# Patient Record
Sex: FEMALE | Race: WHITE | Employment: FULL TIME
[De-identification: names, ages, dates, MRNs, and addresses within clinical notes are randomized per-mention and may not be internally consistent; named-entity substitution may affect disease eponyms.]

---

## 2017-06-17 ENCOUNTER — HEALTH MAINTENANCE LETTER (OUTPATIENT)
Age: 47
End: 2017-06-17

## 2017-10-31 ENCOUNTER — OFFICE VISIT (OUTPATIENT)
Dept: PEDIATRICS | Facility: CLINIC | Age: 47
End: 2017-10-31
Payer: COMMERCIAL

## 2017-10-31 ENCOUNTER — TELEPHONE (OUTPATIENT)
Dept: PEDIATRICS | Facility: CLINIC | Age: 47
End: 2017-10-31

## 2017-10-31 VITALS
DIASTOLIC BLOOD PRESSURE: 86 MMHG | BODY MASS INDEX: 35.58 KG/M2 | TEMPERATURE: 97.4 F | HEIGHT: 64 IN | HEART RATE: 68 BPM | SYSTOLIC BLOOD PRESSURE: 140 MMHG | WEIGHT: 208.4 LBS

## 2017-10-31 DIAGNOSIS — R03.0 ELEVATED BLOOD PRESSURE READING WITHOUT DIAGNOSIS OF HYPERTENSION: ICD-10-CM

## 2017-10-31 DIAGNOSIS — R42 DIZZINESS: Primary | ICD-10-CM

## 2017-10-31 LAB
ALBUMIN SERPL-MCNC: 3.9 G/DL (ref 3.4–5)
ALP SERPL-CCNC: 59 U/L (ref 40–150)
ALT SERPL W P-5'-P-CCNC: 52 U/L (ref 0–50)
ANION GAP SERPL CALCULATED.3IONS-SCNC: 9 MMOL/L (ref 3–14)
AST SERPL W P-5'-P-CCNC: 25 U/L (ref 0–45)
BASOPHILS # BLD AUTO: 0 10E9/L (ref 0–0.2)
BASOPHILS NFR BLD AUTO: 0.4 %
BILIRUB SERPL-MCNC: 0.4 MG/DL (ref 0.2–1.3)
BUN SERPL-MCNC: 11 MG/DL (ref 7–30)
CALCIUM SERPL-MCNC: 9.3 MG/DL (ref 8.5–10.1)
CHLORIDE SERPL-SCNC: 106 MMOL/L (ref 94–109)
CO2 SERPL-SCNC: 26 MMOL/L (ref 20–32)
CREAT SERPL-MCNC: 0.7 MG/DL (ref 0.52–1.04)
DIFFERENTIAL METHOD BLD: NORMAL
EOSINOPHIL # BLD AUTO: 0.2 10E9/L (ref 0–0.7)
EOSINOPHIL NFR BLD AUTO: 2.7 %
ERYTHROCYTE [DISTWIDTH] IN BLOOD BY AUTOMATED COUNT: 12.2 % (ref 10–15)
GFR SERPL CREATININE-BSD FRML MDRD: >90 ML/MIN/1.7M2
GLUCOSE SERPL-MCNC: 93 MG/DL (ref 70–99)
HCT VFR BLD AUTO: 44.6 % (ref 35–47)
HGB BLD-MCNC: 14.9 G/DL (ref 11.7–15.7)
LYMPHOCYTES # BLD AUTO: 2.4 10E9/L (ref 0.8–5.3)
LYMPHOCYTES NFR BLD AUTO: 33.9 %
MCH RBC QN AUTO: 31.6 PG (ref 26.5–33)
MCHC RBC AUTO-ENTMCNC: 33.4 G/DL (ref 31.5–36.5)
MCV RBC AUTO: 95 FL (ref 78–100)
MONOCYTES # BLD AUTO: 0.4 10E9/L (ref 0–1.3)
MONOCYTES NFR BLD AUTO: 6.1 %
NEUTROPHILS # BLD AUTO: 4 10E9/L (ref 1.6–8.3)
NEUTROPHILS NFR BLD AUTO: 56.9 %
PLATELET # BLD AUTO: 295 10E9/L (ref 150–450)
POTASSIUM SERPL-SCNC: 4.3 MMOL/L (ref 3.4–5.3)
PROT SERPL-MCNC: 7.3 G/DL (ref 6.8–8.8)
RBC # BLD AUTO: 4.71 10E12/L (ref 3.8–5.2)
SODIUM SERPL-SCNC: 141 MMOL/L (ref 133–144)
TSH SERPL DL<=0.005 MIU/L-ACNC: 3.42 MU/L (ref 0.4–4)
WBC # BLD AUTO: 7.1 10E9/L (ref 4–11)

## 2017-10-31 PROCEDURE — 36415 COLL VENOUS BLD VENIPUNCTURE: CPT | Performed by: INTERNAL MEDICINE

## 2017-10-31 PROCEDURE — 99213 OFFICE O/P EST LOW 20 MIN: CPT | Performed by: INTERNAL MEDICINE

## 2017-10-31 PROCEDURE — 80050 GENERAL HEALTH PANEL: CPT | Performed by: INTERNAL MEDICINE

## 2017-10-31 NOTE — PROGRESS NOTES
"  SUBJECTIVE:   Roxie Funez is a 47 year old female who presents to clinic today for the following health issues:      Note copied from triage:  Note      Patient feeling odd this am. States she has felt this way for 2 days.  States she is a little dizzy and nauseated. No cold symptoms. No fever or sweats. Took her bp when she got up and it byd990/91 repeated it after eating breakfast and it was 148/88. Requesting to come in and be seen and an appointment was made.            Felt \"off\" yesterday; not dizzy, but a little dizzy and nauseous; in a brain fog. Today felt the same on waking up; blood pressure at work today was up to 152/91.       No infection or chills.  No stomach flu but does feel shortness of breath and tired.  No diarrhea or vomiting.  No sore throat, earache, or headache.   No real focal symptoms.     Sick contacts at work.     Problem list and histories reviewed & adjusted, as indicated.  Additional history: as documented    Patient Active Problem List   Diagnosis     Major depressive disorder, single episode, mild (H)     Patellofemoral pain syndrome     Low back pain     Obesity     Raynaud's disease     Past Surgical History:   Procedure Laterality Date     BREAST LUMPECTOMY, RT/LT  2005    left     C LIGATE FALLOPIAN TUBE,POSTPARTUM  1997    Tubal Ligation     C NONSPECIFIC PROCEDURE  1999    Diagnostic Laparoscopy     C TOTAL ABDOM HYSTERECTOMY  5/2000    Hysterectomy, Total Abdominal     HC DILATION/CURETTAGE DIAG/THER NON OB  2000    D & C     HC REMOVAL OF OVARY/TUBE(S)  5/2000    Salpingo-Oophorectomy, Unilateral       HC REMOVAL OF TONSILS,12+ Y/O         Social History   Substance Use Topics     Smoking status: Never Smoker     Smokeless tobacco: Never Used     Alcohol use 12.0 oz/week      Comment: is cautious about intake due to family history     Family History   Problem Relation Age of Onset     CANCER Mother      ovarian at age 30, no other family hx of ovarian or breast ca " "    DIABETES Father      DIABETES Paternal Grandfather      Alcohol/Drug Brother      both parents also have Etoh issues     Breast Cancer No family hx of      Cancer - colorectal No family hx of      C.A.D. No family hx of      Eye Disorder No family hx of      no glaucoma         No current outpatient prescriptions on file.     Allergies   Allergen Reactions     Sulfa Drugs      BP Readings from Last 3 Encounters:   10/31/17 140/86   09/10/15 120/82   08/28/14 126/78    Wt Readings from Last 3 Encounters:   10/31/17 208 lb 6.4 oz (94.5 kg)   09/10/15 197 lb (89.4 kg)   08/28/14 200 lb (90.7 kg)                  Labs reviewed in EPIC        Reviewed and updated as needed this visit by clinical staff       Reviewed and updated as needed this visit by Provider         ROS:  C: NEGATIVE for fever, chills, change in weight  E/M: NEGATIVE for ear, mouth and throat problems  R: NEGATIVE for significant cough or SOB  CV: NEGATIVE for chest pain, palpitations or peripheral edema    OBJECTIVE:                                                    /86 (BP Location: Right arm, Patient Position: Chair, Cuff Size: Adult Large)  Pulse 68  Temp 97.4  F (36.3  C) (Tympanic)  Ht 5' 3.5\" (1.613 m)  Wt 208 lb 6.4 oz (94.5 kg)  BMI 36.34 kg/m2  Body mass index is 36.34 kg/(m^2).   GENERAL: healthy, alert, well nourished, well hydrated, no distress  HENT: ear canals- normal; TMs- normal; Nose- normal; Mouth- no ulcers, no lesions  NECK: no tenderness, no adenopathy, no asymmetry, no masses, no stiffness; thyroid- normal to palpation  RESP: lungs clear to auscultation - no rales, no rhonchi, no wheezes  CV: regular rates and rhythm, normal S1 S2, no S3 or S4 and no murmur, no click or rub -  ABDOMEN: soft, no tenderness, no  hepatosplenomegaly, no masses, normal bowel sounds    Diagnostic test results:  Diagnostic Test Results:  none        ASSESSMENT/PLAN:                                                    1. Dizziness  Feels " ill, but has no significant focal symptoms. Most likely  Has viral illness, but will obtain labs to ensure no abnormalities with electrolytes, liver, and kidney panels.     2. Elevated blood pressure reading without diagnosis of hypertension  Follow up in 2-4 weeks for recheck.   - TSH with free T4 reflex  - Comprehensive metabolic panel  - CBC with platelets and differential      See Patient Instructions    Sohail Borges MD  [facility]

## 2017-10-31 NOTE — TELEPHONE ENCOUNTER
Patient feeling odd this am. States she has felt this way for 2 days.  States she is a little dizzy and nauseated. No cold symptoms. No fever or sweats. Took her bp when she got up and it tjv600/91 repeated it after eating breakfast and it was 148/88. Requesting to come in and be seen and an appointment was made.

## 2017-10-31 NOTE — NURSING NOTE
"Chief Complaint   Patient presents with     Elevated BP       Initial /86 (BP Location: Right arm, Patient Position: Chair, Cuff Size: Adult Large)  Pulse 68  Temp 97.4  F (36.3  C) (Tympanic)  Ht 5' 3.5\" (1.613 m)  Wt 208 lb 6.4 oz (94.5 kg)  BMI 36.34 kg/m2 Estimated body mass index is 36.34 kg/(m^2) as calculated from the following:    Height as of this encounter: 5' 3.5\" (1.613 m).    Weight as of this encounter: 208 lb 6.4 oz (94.5 kg).  Medication Reconciliation: complete   Amanda Banda LPN      "

## 2017-10-31 NOTE — PATIENT INSTRUCTIONS
"Lab work downstairs today.  Directions:  As you walk through the first floor, you'll see (on the right) first the pharmacy, then some bathrooms, then the \"Lab and Imaging\" area. Give them your name at the window there and wait for them to call you.     Follow up with an appointment in 2-4 weeks to ensure your blood pressure improves.    Sohail Borges MD  Internal Medicine and Pediatrics   "

## 2017-10-31 NOTE — MR AVS SNAPSHOT
"              After Visit Summary   10/31/2017    Roxie Funez    MRN: [id number]           Patient Information     Date Of Birth          1970        Visit Information        Provider Department      10/31/2017 9:40 AM Sohail Borges MD [facility]        Today's Diagnoses     Dizziness    -  1    Elevated blood pressure reading without diagnosis of hypertension          Care Instructions    Lab work downstairs today.  Directions:  As you walk through the first floor, you'll see (on the right) first the pharmacy, then some bathrooms, then the \"Lab and Imaging\" area. Give them your name at the window there and wait for them to call you.     Follow up with an appointment in 2-4 weeks to ensure your blood pressure improves.    Sohail Borges MD  Internal Medicine and Pediatrics           Follow-ups after your visit        Who to contact     If you have questions or need follow up information about today's clinic visit or your schedule please contact [facility] directly at [phone].  Normal or non-critical lab and imaging results will be communicated to you by MyChart, letter or phone within 4 business days after the clinic has received the results. If you do not hear from us within 7 days, please contact the clinic through [facility] or phone. If you have a critical or abnormal lab result, we will notify you by phone as soon as possible.  Submit refill requests through [employer] or call your pharmacy and they will forward the refill request to us. Please allow 3 business days for your refill to be completed.          Additional Information About Your Visit        MyChart Information     [employer] gives you secure access to your electronic health record. If you see a primary care provider, you can also send messages to your care team and make appointments. If you have questions, please call your primary care clinic.  If you do not have a primary care provider, please call [phone] and " "they will assist you.        Care EveryWhere ID     This is your Care EveryWhere ID. This could be used by other organizations to access your [place] medical records  [id number]        Your Vitals Were     Pulse Temperature Height BMI (Body Mass Index)          68 97.4  F (36.3  C) (Tympanic) 5' 3.5\" (1.613 m) 36.34 kg/m2         Blood Pressure from Last 3 Encounters:   10/31/17 140/86   09/10/15 120/82   08/28/14 126/78    Weight from Last 3 Encounters:   10/31/17 208 lb 6.4 oz (94.5 kg)   09/10/15 197 lb (89.4 kg)   08/28/14 200 lb (90.7 kg)              We Performed the Following     CBC with platelets and differential     Comprehensive metabolic panel     TSH with free T4 reflex        Primary Care Provider Office Phone # Fax #    [facility] [phone] [address] [facility] [address]        Equal Access to Services     AGUSTÍN VARMA : Hadii aad ku hadasho Soomaali, waaxda luqadaha, qaybta kaalmada adeegyada, waxay idiin hayshy joseph . So [facility] [place] [phone].    ATENCIÓN: Si habla español, tiene a minor disposición servicios gratuitos de asistencia lingüística. Llame al [phone].    We comply with applicable federal civil rights laws and [place] laws. We do not discriminate on the basis of race, color, national origin, age, disability, sex, sexual orientation, or gender identity.            Thank you!     Thank you for choosing [facility]  for your care. Our goal is always to provide you with excellent care. Hearing back from our patients is one way we can continue to improve our services. Please take a few minutes to complete the written survey that you may receive in the mail after your visit with us. Thank you!             Your Updated Medication List - Protect others around you: Learn how to safely use, store and throw away your medicines at www.disposemymeds.org.      Notice  As of 10/31/2017 10:10 AM    You have not been " prescribed any medications.

## 2017-10-31 NOTE — LETTER
October 31, 2017      Roxie Funez  [address] [phone]        To Whom It May Concern:    Roxie Funez was seen in our clinic. She may return to work without restrictions on 11/2/17.      Sincerely,        Sohail Borges MD

## 2018-01-19 ENCOUNTER — TELEPHONE (OUTPATIENT)
Dept: PEDIATRICS | Facility: CLINIC | Age: 48
End: 2018-01-19

## 2018-01-19 NOTE — TELEPHONE ENCOUNTER
"1/19/2018      Patient Communication Preferences indicate  Do not contact  and/or communication by \"Phone\" is not preferred. Call not required per Outreach team.          Outreach ,  Alexis Gibson    "

## 2018-08-07 ENCOUNTER — APPOINTMENT (OUTPATIENT)
Dept: GENERAL RADIOLOGY | Facility: CLINIC | Age: 48
End: 2018-08-07
Attending: EMERGENCY MEDICINE
Payer: COMMERCIAL

## 2018-08-07 ENCOUNTER — HOSPITAL ENCOUNTER (EMERGENCY)
Facility: CLINIC | Age: 48
Discharge: HOME | End: 2018-08-07
Attending: EMERGENCY MEDICINE | Admitting: EMERGENCY MEDICINE
Payer: COMMERCIAL

## 2018-08-07 VITALS
HEART RATE: 59 BPM | RESPIRATION RATE: 16 BRPM | DIASTOLIC BLOOD PRESSURE: 86 MMHG | OXYGEN SATURATION: 100 % | TEMPERATURE: 98 F | SYSTOLIC BLOOD PRESSURE: 139 MMHG

## 2018-08-07 DIAGNOSIS — R07.9 ACUTE CHEST PAIN: ICD-10-CM

## 2018-08-07 LAB
ANION GAP SERPL CALCULATED.3IONS-SCNC: 7 MMOL/L (ref 3–14)
BASOPHILS # BLD AUTO: 0.1 10E9/L (ref 0–0.2)
BASOPHILS NFR BLD AUTO: 0.8 %
BUN SERPL-MCNC: 8 MG/DL (ref 7–30)
CALCIUM SERPL-MCNC: 8.9 MG/DL (ref 8.5–10.1)
CHLORIDE SERPL-SCNC: 107 MMOL/L (ref 94–109)
CO2 SERPL-SCNC: 28 MMOL/L (ref 20–32)
CREAT SERPL-MCNC: 0.72 MG/DL (ref 0.52–1.04)
D DIMER PPP FEU-MCNC: <0.3 UG/ML FEU (ref 0–0.5)
DIFFERENTIAL METHOD BLD: NORMAL
EOSINOPHIL # BLD AUTO: 0.2 10E9/L (ref 0–0.7)
EOSINOPHIL NFR BLD AUTO: 3.3 %
ERYTHROCYTE [DISTWIDTH] IN BLOOD BY AUTOMATED COUNT: 12.3 % (ref 10–15)
GFR SERPL CREATININE-BSD FRML MDRD: 86 ML/MIN/1.7M2
GLUCOSE SERPL-MCNC: 86 MG/DL (ref 70–99)
HCT VFR BLD AUTO: 43.6 % (ref 35–47)
HGB BLD-MCNC: 14.3 G/DL (ref 11.7–15.7)
IMM GRANULOCYTES # BLD: 0 10E9/L (ref 0–0.4)
IMM GRANULOCYTES NFR BLD: 0.3 %
LYMPHOCYTES # BLD AUTO: 2.1 10E9/L (ref 0.8–5.3)
LYMPHOCYTES NFR BLD AUTO: 33.8 %
MCH RBC QN AUTO: 31.2 PG (ref 26.5–33)
MCHC RBC AUTO-ENTMCNC: 32.8 G/DL (ref 31.5–36.5)
MCV RBC AUTO: 95 FL (ref 78–100)
MONOCYTES # BLD AUTO: 0.3 10E9/L (ref 0–1.3)
MONOCYTES NFR BLD AUTO: 4.9 %
NEUTROPHILS # BLD AUTO: 3.5 10E9/L (ref 1.6–8.3)
NEUTROPHILS NFR BLD AUTO: 56.9 %
NRBC # BLD AUTO: 0 10*3/UL
NRBC BLD AUTO-RTO: 0 /100
PLATELET # BLD AUTO: 296 10E9/L (ref 150–450)
POTASSIUM SERPL-SCNC: 3.8 MMOL/L (ref 3.4–5.3)
RBC # BLD AUTO: 4.58 10E12/L (ref 3.8–5.2)
SODIUM SERPL-SCNC: 142 MMOL/L (ref 133–144)
TROPONIN I SERPL-MCNC: <0.015 UG/L (ref 0–0.04)
TROPONIN I SERPL-MCNC: <0.015 UG/L (ref 0–0.04)
WBC # BLD AUTO: 6.2 10E9/L (ref 4–11)

## 2018-08-07 PROCEDURE — 25000132 ZZH RX MED GY IP 250 OP 250 PS 637: Performed by: EMERGENCY MEDICINE

## 2018-08-07 PROCEDURE — 93005 ELECTROCARDIOGRAM TRACING: CPT

## 2018-08-07 PROCEDURE — 93005 ELECTROCARDIOGRAM TRACING: CPT | Mod: 76

## 2018-08-07 PROCEDURE — 84484 ASSAY OF TROPONIN QUANT: CPT | Mod: 91 | Performed by: EMERGENCY MEDICINE

## 2018-08-07 PROCEDURE — 85025 COMPLETE CBC W/AUTO DIFF WBC: CPT | Performed by: EMERGENCY MEDICINE

## 2018-08-07 PROCEDURE — 71046 X-RAY EXAM CHEST 2 VIEWS: CPT

## 2018-08-07 PROCEDURE — 85379 FIBRIN DEGRADATION QUANT: CPT | Performed by: EMERGENCY MEDICINE

## 2018-08-07 PROCEDURE — 80048 BASIC METABOLIC PNL TOTAL CA: CPT | Performed by: EMERGENCY MEDICINE

## 2018-08-07 PROCEDURE — 99285 EMERGENCY DEPT VISIT HI MDM: CPT | Mod: 25

## 2018-08-07 RX ORDER — ASPIRIN 81 MG/1
324 TABLET, CHEWABLE ORAL ONCE
Status: COMPLETED | OUTPATIENT
Start: 2018-08-07 | End: 2018-08-07

## 2018-08-07 RX ORDER — NITROGLYCERIN 0.4 MG/1
0.4 TABLET SUBLINGUAL EVERY 5 MIN PRN
Status: DISCONTINUED | OUTPATIENT
Start: 2018-08-07 | End: 2018-08-07 | Stop reason: HOSPADM

## 2018-08-07 RX ADMIN — NITROGLYCERIN 0.4 MG: 0.4 TABLET, ORALLY DISINTEGRATING SUBLINGUAL at 14:51

## 2018-08-07 RX ADMIN — ASPIRIN 81 MG 324 MG: 81 TABLET ORAL at 14:50

## 2018-08-07 ASSESSMENT — ENCOUNTER SYMPTOMS
DIAPHORESIS: 1
NAUSEA: 1
CHEST TIGHTNESS: 1
SHORTNESS OF BREATH: 0

## 2018-08-07 NOTE — ED AVS SNAPSHOT
[facility]    [address]    [facility] [address]    Phone:  [phone]    Fax:  [phone]                                       Roxie Funez   MRN: [id number]    Department:  [facility]   Date of Visit:  8/7/2018           After Visit Summary Signature Page     I have received my discharge instructions, and my questions have been answered. I have discussed any challenges I see with this plan with the nurse or doctor.    ..........................................................................................................................................  Patient/Patient Representative Signature      ..........................................................................................................................................  Patient Representative Print Name and Relationship to Patient    ..................................................               ................................................  Date                                            Time    ..........................................................................................................................................  Reviewed by Signature/Title    ...................................................              ..............................................  Date                                                            Time

## 2018-08-07 NOTE — DISCHARGE INSTRUCTIONS
Follow-up:  Please follow-up with your primary care provider in 2-3 days for re-evaluation and discussion of your visit to the emergency department today.  You have also been scheduled for an outpatient stress test in the next 72 hours.  You can expect to be contacted regarding the time and location of this test.    Home treatments:  Recommended home therapies include continuing with your previously prescribed home medications.  If you are on a beta-blocking medication, please hold this the day of your stress test.    Return precautions:  Warning signs which should prompt you to return to the ER include recurrent chest pain, difficulty breathing, feeling abnormal heart beats, fainting episodes, or any other new or troubling symptoms.  We are always happy to see you again.    Discharge Instructions  Chest Pain    You have been seen today for chest pain or discomfort.  At this time, your doctor has found no signs that your chest pain is due to a serious or life-threatening condition, (or you have declined more testing and/or admission to the hospital). However, sometimes there is a serious problem that does not show up right away. Your evaluation today may not be complete and you may need further testing and evaluation.     You need to follow-up with your regular doctor within 3 days.    Return to the Emergency Department if:    Your chest pain changes, gets worse, starts to happen more often, or comes with less activity.    You are short of breath.    You get very weak or tired.    You pass out or faint.    You have any new symptoms, like fever, cough, numb legs, or you cough up blood.    You have anything else that worries you.    Until you follow-up with your regular doctor please do the following:    Take one aspirin daily unless you have an allergy or are told not to by your doctor.    If a stress test appointment has been made, go to the appointment.    If you have questions, contact your regular doctor.    If  your doctor today has told you to follow-up with your regular doctor, it is very important that you make an appointment with your clinic and go to the appointment.  If you do not follow-up with your primary doctor, it may result in missing an important development which could result in permanent injury or disability and/or lasting pain.  If there is any problem keeping your appointment, call your doctor or return to the Emergency Department.    If you were given a prescription for medicine here today, be sure to read all of the information (including the package insert) that comes with your prescription.  This will include important information about the medicine, its side effects, and any warnings that you need to know about.  The pharmacist who fills the prescription can provide more information and answer questions you may have about the medicine.  If you have questions or concerns that the pharmacist cannot address, please call or return to the Emergency Department.     Opioid Medication Information    Pain medications are among the most commonly prescribed medicines, so we are including this information for all our patients. If you did not receive pain medication or get a prescription for pain medicine, you can ignore it.     You may have been given a prescription for an opioid (narcotic) pain medicine and/or have received a pain medicine while here in the Emergency Department. These medicines can make you drowsy or impaired. You must not drive, operate dangerous equipment, or engage in any other dangerous activities while taking these medications. If you drive while taking these medications, you could be arrested for DUI, or driving under the influence. Do not drink any alcohol while you are taking these medications.     Opioid pain medications can cause addiction. If you have a history of chemical dependency of any type, you are at a higher risk of becoming addicted to pain medications.  Only take these  prescribed medications to treat your pain when all other options have been tried. Take it for as short a time and as few doses as possible. Store your pain pills in a secure place, as they are frequently stolen and provide a dangerous opportunity for children or visitors in your house to start abusing these powerful medications. We will not replace any lost or stolen medicine.  As soon as your pain is better, you should flush all your remaining medication.     Many prescription pain medications contain Tylenol  (acetaminophen), including Vicodin , Tylenol #3 , Norco , Lortab , and Percocet .  You should not take any extra pills of Tylenol  if you are using these prescription medications or you can get very sick.  Do not ever take more than 3000 mg of acetaminophen in any 24 hour period.    All opioids tend to cause constipation. Drink plenty of water and eat foods that have a lot of fiber, such as fruits, vegetables, prune juice, apple juice and high fiber cereal.  Take a laxative if you don t move your bowels at least every other day. Miralax , Milk of Magnesia, Colace , or Senna  can be used to keep you regular.      Remember that you can always come back to the Emergency Department if you are not able to see your regular doctor in the amount of time listed above, if you get any new symptoms, or if there is anything that worries you.

## 2018-08-07 NOTE — ED AVS SNAPSHOT
[facility] Emergency Department    [address]    [facility] [address]    Phone:  [phone]    Fax:  [phone]                                       Roxie Funez   MRN: [id number]    Department:  [facility] Emergency Department   Date of Visit:  8/7/2018           Patient Information     Date Of Birth          1970        Your diagnoses for this visit were:     Acute chest pain        You were seen by Gaudencio Dalton MD.      Follow-up Information     Follow up with Soahil Borges MD. Schedule an appointment as soon as possible for a visit in 2 days.    Specialties:  Internal Medicine, Pediatrics    Contact information:    [facility] [facility]   [place] [address]  [phone]          Follow up with [facility] Emergency Department.    Specialty:  EMERGENCY MEDICINE    Why:  If symptoms worsen    Contact information:    [address]  [facility] [id number] [phone]        Discharge Instructions       Follow-up:  Please follow-up with your primary care provider in 2-3 days for re-evaluation and discussion of your visit to the emergency department today.  You have also been scheduled for an outpatient stress test in the next 72 hours.  You can expect to be contacted regarding the time and location of this test.    Home treatments:  Recommended home therapies include continuing with your previously prescribed home medications.  If you are on a beta-blocking medication, please hold this the day of your stress test.    Return precautions:  Warning signs which should prompt you to return to the ER include recurrent chest pain, difficulty breathing, feeling abnormal heart beats, fainting episodes, or any other new or troubling symptoms.  We are always happy to see you again.    Discharge Instructions  Chest Pain    You have been seen today for chest pain or discomfort.  At this time, your doctor has found no signs that  your chest pain is due to a serious or life-threatening condition, (or you have declined more testing and/or admission to the hospital). However, sometimes there is a serious problem that does not show up right away. Your evaluation today may not be complete and you may need further testing and evaluation.     You need to follow-up with your regular doctor within 3 days.    Return to the Emergency Department if:    Your chest pain changes, gets worse, starts to happen more often, or comes with less activity.    You are short of breath.    You get very weak or tired.    You pass out or faint.    You have any new symptoms, like fever, cough, numb legs, or you cough up blood.    You have anything else that worries you.    Until you follow-up with your regular doctor please do the following:    Take one aspirin daily unless you have an allergy or are told not to by your doctor.    If a stress test appointment has been made, go to the appointment.    If you have questions, contact your regular doctor.    If your doctor today has told you to follow-up with your regular doctor, it is very important that you make an appointment with your clinic and go to the appointment.  If you do not follow-up with your primary doctor, it may result in missing an important development which could result in permanent injury or disability and/or lasting pain.  If there is any problem keeping your appointment, call your doctor or return to the Emergency Department.    If you were given a prescription for medicine here today, be sure to read all of the information (including the package insert) that comes with your prescription.  This will include important information about the medicine, its side effects, and any warnings that you need to know about.  The pharmacist who fills the prescription can provide more information and answer questions you may have about the medicine.  If you have questions or concerns that the pharmacist cannot  address, please call or return to the Emergency Department.     Opioid Medication Information    Pain medications are among the most commonly prescribed medicines, so we are including this information for all our patients. If you did not receive pain medication or get a prescription for pain medicine, you can ignore it.     You may have been given a prescription for an opioid (narcotic) pain medicine and/or have received a pain medicine while here in the Emergency Department. These medicines can make you drowsy or impaired. You must not drive, operate dangerous equipment, or engage in any other dangerous activities while taking these medications. If you drive while taking these medications, you could be arrested for DUI, or driving under the influence. Do not drink any alcohol while you are taking these medications.     Opioid pain medications can cause addiction. If you have a history of chemical dependency of any type, you are at a higher risk of becoming addicted to pain medications.  Only take these prescribed medications to treat your pain when all other options have been tried. Take it for as short a time and as few doses as possible. Store your pain pills in a secure place, as they are frequently stolen and provide a dangerous opportunity for children or visitors in your house to start abusing these powerful medications. We will not replace any lost or stolen medicine.  As soon as your pain is better, you should flush all your remaining medication.     Many prescription pain medications contain Tylenol  (acetaminophen), including Vicodin , Tylenol #3 , Norco , Lortab , and Percocet .  You should not take any extra pills of Tylenol  if you are using these prescription medications or you can get very sick.  Do not ever take more than 3000 mg of acetaminophen in any 24 hour period.    All opioids tend to cause constipation. Drink plenty of water and eat foods that have a lot of fiber, such as fruits, vegetables,  prune juice, apple juice and high fiber cereal.  Take a laxative if you don t move your bowels at least every other day. Miralax , Milk of Magnesia, Colace , or Senna  can be used to keep you regular.      Remember that you can always come back to the Emergency Department if you are not able to see your regular doctor in the amount of time listed above, if you get any new symptoms, or if there is anything that worries you.            24 Hour Appointment Hotline       To make an appointment at any [facility], call [phone] ([phone]). If you don't have a family doctor or clinic, we will help you find one. [facility] are conveniently located to serve the needs of you and your family.          ED Discharge Orders     Exercise Stress Echocardiogram       Administration of IV contrast will be tailored to this examination per the appropriate written protocol listed in the Echocardiography department Protocol Book, or by the supervising Cardiologist. This may result in an order change.    Use of contrast is at the discretion of the supervising Cardiologist.            Follow-Up with Cardiologist                    Review of your medicines      Notice     You have not been prescribed any medications.            Procedures and tests performed during your visit     Procedure/Test Number of Times Performed    Basic metabolic panel 1    CBC with platelets differential 1    Cardiac Continuous Monitoring 1    Chest XR,  PA & LAT 1    D dimer quantitative 1    EKG 12 lead 2    EKG 12-lead, tracing only 1    Peripheral IV: Standard 1    Pulse oximetry nursing 1    Review medications with patient 1    Troponin I 2      Orders Needing Specimen Collection     Ordered          08/07/18 1653  Troponin I - STAT, Prio: STAT, Final result     Scheduled Task Status   08/07/18 1652 [email or web address] CC Reminder: Open   Order Class:  PCU Collect                  Pending Results     Date and Time Order Name Status Description     8/7/2018 1437 EKG 12-lead, tracing only Preliminary             Pending Culture Results     No orders found from 8/5/2018 to 8/8/2018.            Pending Results Instructions     If you had any lab results that were not finalized at the time of your Discharge, you can call the ED Lab Result RN at [phone]. You will be contacted by this team for any positive Lab results or changes in treatment. The nurses are available 7 days a week from 10A to 6:30P.  You can leave a message 24 hours per day and they will return your call.        Test Results From Your Hospital Stay        8/7/2018  3:22 PM      Component Results     Component Value Ref Range & Units Status    WBC 6.2 4.0 - 11.0 10e9/L Final    RBC Count 4.58 3.8 - 5.2 10e12/L Final    Hemoglobin 14.3 11.7 - 15.7 g/dL Final    Hematocrit 43.6 35.0 - 47.0 % Final    MCV 95 78 - 100 fl Final    MCH 31.2 26.5 - 33.0 pg Final    MCHC 32.8 31.5 - 36.5 g/dL Final    RDW 12.3 10.0 - 15.0 % Final    Platelet Count 296 150 - 450 10e9/L Final    Diff Method Automated Method  Final    % Neutrophils 56.9 % Final    % Lymphocytes 33.8 % Final    % Monocytes 4.9 % Final    % Eosinophils 3.3 % Final    % Basophils 0.8 % Final    % Immature Granulocytes 0.3 % Final    Nucleated RBCs 0 0 /100 Final    Absolute Neutrophil 3.5 1.6 - 8.3 10e9/L Final    Absolute Lymphocytes 2.1 0.8 - 5.3 10e9/L Final    Absolute Monocytes 0.3 0.0 - 1.3 10e9/L Final    Absolute Eosinophils 0.2 0.0 - 0.7 10e9/L Final    Absolute Basophils 0.1 0.0 - 0.2 10e9/L Final    Abs Immature Granulocytes 0.0 0 - 0.4 10e9/L Final    Absolute Nucleated RBC 0.0  Final         8/7/2018  3:42 PM      Component Results     Component Value Ref Range & Units Status    Sodium 142 133 - 144 mmol/L Final    Potassium 3.8 3.4 - 5.3 mmol/L Final    Chloride 107 94 - 109 mmol/L Final    Carbon Dioxide 28 20 - 32 mmol/L Final    Anion Gap 7 3 - 14 mmol/L Final    Glucose 86 70 - 99 mg/dL Final    Urea Nitrogen 8 7 - 30 mg/dL  Final    Creatinine 0.72 0.52 - 1.04 mg/dL Final    GFR Estimate 86 >60 mL/min/1.7m2 Final    Non  GFR Calc    GFR Estimate If Black >90 >60 mL/min/1.7m2 Final    African American GFR Calc    Calcium 8.9 8.5 - 10.1 mg/dL Final         8/7/2018  3:42 PM      Component Results     Component Value Ref Range & Units Status    Troponin I ES <0.015 0.000 - 0.045 ug/L Final    The 99th percentile for upper reference range is 0.045 ug/L.  Troponin values   in the range of 0.045 - 0.120 ug/L may be associated with risks of adverse   clinical events.           8/7/2018  3:33 PM      Component Results     Component Value Ref Range & Units Status    D Dimer <0.3 0.0 - 0.50 ug/ml FEU Final    This D-dimer assay is intended for use in conjunction with a clinical pretest   probability assessment model to exclude pulmonary embolism (PE) and deep   venous thrombosis (DVT) in outpatients suspected of PE or DVT. The cut-off   value is 0.5 ug/mL FEU.           8/7/2018  5:36 PM      Narrative     CHEST TWO VIEWS  8/7/2018 4:06 PM     HISTORY: 47-year-old woman with chest pain.    COMPARISON: None         Impression     IMPRESSION: Heart size is normal. No pleural effusion, pneumothorax,  or abnormal area of consolidation.    MARJORIE RANDHAWA MD         8/7/2018  5:32 PM      Component Results     Component Value Ref Range & Units Status    Troponin I ES <0.015 0.000 - 0.045 ug/L Final    The 99th percentile for upper reference range is 0.045 ug/L.  Troponin values   in the range of 0.045 - 0.120 ug/L may be associated with risks of adverse   clinical events.                  Clinical Quality Measure: Blood Pressure Screening     Your blood pressure was checked while you were in the emergency department today. The last reading we obtained was  BP: 139/86 . Please read the guidelines below about what these numbers mean and what you should do about them.  If your systolic blood pressure (the top number) is less than 120 and  your diastolic blood pressure (the bottom number) is less than 80, then your blood pressure is normal. There is nothing more that you need to do about it.  If your systolic blood pressure (the top number) is 120-139 or your diastolic blood pressure (the bottom number) is 80-89, your blood pressure may be higher than it should be. You should have your blood pressure rechecked within a year by a primary care provider.  If your systolic blood pressure (the top number) is 140 or greater or your diastolic blood pressure (the bottom number) is 90 or greater, you may have high blood pressure. High blood pressure is treatable, but if left untreated over time it can put you at risk for heart attack, stroke, or kidney failure. You should have your blood pressure rechecked by a primary care provider within the next 4 weeks.  If your provider in the emergency department today gave you specific instructions to follow-up with your doctor or provider even sooner than that, you should follow that instruction and not wait for up to 4 weeks for your follow-up visit.        Thank you for choosing [facility]       Thank you for choosing [facility] for your care. Our goal is always to provide you with excellent care. Hearing back from our patients is one way we can continue to improve our services. Please take a few minutes to complete the written survey that you may receive in the mail after you visit with us. Thank you!        [facility] Information     [facility] gives you secure access to your electronic health record. If you see a primary care provider, you can also send messages to your care team and make appointments. If you have questions, please call your primary care clinic.  If you do not have a primary care provider, please call [phone] and they will assist you.        Care EveryWhere ID     This is your Care EveryWhere ID. This could be used by other organizations to access your [facility] medical records  [id number]         Equal Access to Services     AGUSTÍN VARMA : Philip Melendez, karen arreola, srikanth alvares. So [facility] [phone].    ATENCIÓN: Si habla español, tiene a minor disposición servicios gratuitos de asistencia lingüística. Llame al [phone].    We comply with applicable federal civil rights laws and [place] laws. We do not discriminate on the basis of race, color, national origin, age, disability, sex, sexual orientation, or gender identity.            After Visit Summary       This is your record. Keep this with you and show to your community pharmacist(s) and doctor(s) at your next visit.

## 2018-08-07 NOTE — ED PROVIDER NOTES
History     Chief Complaint:  Chest pain    The history is provided by the patient.   Roxie Funez is a 47 year old female with a history of depression who presents to the emergency department for evaluation of chest pain. Starting yesterday, the patient reports having a bad headache (gradual in onset, not thunderclap in nature). She states she went to lay down during her lunch break. She also reports feeling nauseous throughout the day as well as random sharp pains in her right side rated at 9-10/10, which increased in severity and frequency as the day progressed. Then starting last night upon returning home from work (around 5 pm), the patient reports the onset of constant chest discomfort, described as tight and heavy rated at 5-6/10.  This pain has remained constant since it began yesterday, and is without clear exacerbating or alleviating factors. The continuation of this chest pain today prompted the patient to seek evaluation here in the emergency department.    Here, the patient denies shortness of breath with the pain but notes she states she does hyperventilate because she is worried about this pain.  She denies similar chest pain in the past. She also reports recently having a cough and thought initially her chest discomfort was from this.  Her cough is improving, and is non - productive.  She denies pleuritic pain, and pain does not change with changes in position. She states she has taken Advil and aspirin for her pain, with aspirin last taken at 2000 last night.     Cardiac/PE/DVT Risk Factors:  The patient denies history of hypertension, hyperlipidemia, diabetes, and smoking. She denies a personal or family history of heart issues.The patient denies any personal or familial history of PE, DVT, or clotting disorder. The patient reports denies recent travel, surgery, or other immobilizations. She denies use of hormones.     Allergies:  Sulfa Drugs     Medications:    The patient is not currently  taking any prescribed medications.    Past Medical History:    Obesity  Raynaud's disease  Patellofemoral pain syndrome  Depression  Lump / Mass in breast     Past Surgical History:    Left breast lumpectomy  Tubal ligation  D & C  Total hysterectomy  Tonsillectomy    Family History:    Ovarian cancer  diabetes mellitus  Alcoholism  Breast Cancer  Denies family history of colorectal cancer, CAD, and glaucoma    Social History:  Negative for tobacco use.  Positive for alcohol use  Marital Status:       Review of Systems   Constitutional: Positive for diaphoresis.   Respiratory: Positive for chest tightness. Negative for shortness of breath.    Cardiovascular: Positive for chest pain.   Gastrointestinal: Positive for nausea.   All other systems reviewed and are negative.    Physical Exam     Patient Vitals for the past 24 hrs:   BP Temp Temp src Heart Rate Resp SpO2   08/07/18 1730 139/86 - - 59 - 100 %   08/07/18 1715 138/83 - - 52 - 100 %   08/07/18 1700 140/90 - - 57 - 100 %   08/07/18 1645 133/82 - - 56 - 100 %   08/07/18 1545 148/76 - - 53 - -   08/07/18 1530 (!) 147/105 - - 56 - -   08/07/18 1515 134/82 - - 51 - 98 %   08/07/18 1445 130/82 - - - - 99 %   08/07/18 1430 (!) 154/94 - - - - 100 %   08/07/18 1426 (!) 175/92 98  F (36.7  C) Oral 57 20 99 %         Physical Exam  General:                        Well-nourished                        Speaking in full sentences  Eyes:                        Conjunctiva without injection or scleral icterus  ENT:                        Moist mucous membranes                        Nares patent                        Pinnae normal  Neck:                        Full ROM                        No stiffness appreciated  Resp:                        Lungs CTAB                        No crackles, wheezing or audible rubs                        Good air movement  CV:                                        Normal rate, regular rhythm                        S1 and S2  present                        No murmur, gallop or rub                        Tenderness to palpation over anterior chest wall (does not reproduce symptoms)  GI:                        BS present                        Abdomen soft without distention                        Non-tender to light and deep palpation                        No guarding or rebound tenderness  Skin:                        Warm, dry, well perfused                        No rashes or open wounds on exposed skin  MSK:                        Moves all extremities                        No focal deformities or swelling  Neuro:                        Alert                        Answers questions appropriately                        Moves all extremities equally                        Gait stable  Psych:                        Flat affect        HEART Score  Background  Calculates the overall risk of adverse event in patient's presenting with chest pain.  Based on 5 criteria (each assigned 0-2 points) including suspiciousness of history, EKG, age, risk factors and troponin.    Data  47 year old female  has Major depressive disorder, single episode, mild (H); Patellofemoral pain syndrome; Low back pain; Obesity; and Raynaud's disease on her problem list.   reports that she has never smoked. She has never used smokeless tobacco.  family history includes Alcohol/Drug in her brother; Cancer in her mother; Diabetes in her father and paternal grandfather. There is no history of Breast Cancer, Cancer - colorectal, C.A.D., or Eye Disorder.  No results found for: TROPI  Criteria                        0-2 points for each of 5 items (maximum of 10 points):  Score 0- History slightly suspicious for coronary syndrome  Score 0- EKG Normal  Score 1- Age 45 to 65 years old  Score 0- No risk factors for atherosclerotic disease  Score 0- Within normal limits for troponin levels  Interpretation  Risk of adverse outcome  Heart Score: 1  Total Score 0-3- Adverse  Outcome Risk 2.5% - Supports early discharge with appropriate follow-up    Emergency Department Course   ECG:  Indication: chest pain  Time: 59  Vent. Rate 134 bpm. PA interval 134. QRS duration 86. QT/QTc 446/441. P-R-T axis 31 32 27. Sinus bradycardia. Otherwise normal ECG. Agrees with computer interpretation. Read time: 1443.    Indication: Repeat ECG, chest pain free at the time  Time: 1712  Vent. Rate 54 bpm. PA interval 138. QRS duration 90. QT/QTc 458. P-R-T axis 434 26 36. 25 Read time: Sinus bradycardia. Otherwise normal ECG. Agrees with computer interpretor. No significant changes from 8/7/18. Read time: 1738.     Imaging:  Radiographic findings were communicated with the patient who voiced understanding of the findings.    XR Chest 2 views:   Heart size is normal. No pleural effusion, pneumothorax,  or abnormal area of consolidation. As per radiology.     Laboratory:  CBC: WBC: 6.2, HGB: 14.3, PLT: 296  BMP: All WNL (Creatinine: 0.72)  1500 Troponin: <0.015  1707 Troponin: <0.015  D-dimer: <0.3    Interventions:  1450 Aspirin 324 mg PO  1451 Nitroglycerin 0.4 mg PO    Emergency Department Course:  1442 Nursing notes and vitals reviewed.  I performed an exam of the patient as documented above.     IV inserted. Medicine administered as documented above. Blood drawn. This was sent to the lab for further testing, results above.    The patient was placed on continuous pulse oximetry, cardiac monitoring, and nasal cannula while here in the ED.      EKG obtained in the ED, see results above.     The patient was sent for a chest xray while in the emergency department, findings above.     1647 I rechecked the patient and discussed the results of her workup thus far. The patient states she is chest pain free.    EKG was repeated here in the ED, findings above. Repeat Troponin drawn.    1742 I rechecked the patient and discussed the results of their workup thus far.     Findings and plan explained to the Patient.  Patient discharged home with instructions regarding supportive care, medications, and reasons to return. The importance of close follow-up was reviewed.     I personally reviewed the laboratory results with the Patient and answered all related questions prior to discharge.   Impression & Plan    Medical Decision Making:  Roxie Funez is a 47 year old female who presented to the ER for evaluation of chest pain.  Vital signs on presentation reveal elevated BP which improved throughout the patient's ED course and were otherwise unremarkable.  Differential diagnosis is broad, including, but is not limited to acute coronary syndrome, pulmonary embolism, aortic dissection, pneumonia, pneumothorax, musculoskeletal pain, esophageal spasm, atypical reflux / gastritis, anxiety, among others.    Work-up in the ED included EKG, imaging and laboratory studies.  EKG reveals sinus bradycardia, without other acute ischemic changes and no dynamic changes on repeat EKG.  ACS was strongly considered, although felt unlikely at this point.  Troponin both on arrival and repeat troponin are undetectable, and given constant pain since yesterday afternoon, I feel this strongly argues against acute ischemia.  There does not appear to be any association with exertion, nor other associated symptoms including SOB, nausea, or vomiting.  Symptoms were improved following aspirin and nitroglycerin and she remained pain free during her ED course.  HEART Score calculated to be 1, thus placing the patient at low risk for adverse cardiac outcome.  PE unlikely as patient is low risk by Wells criteria and D-dimer is negative.  Aortic dissection unlikely in the absence of ripping or tearing pain radiating towards patient's back, symmetric radial pulse, and unremarkable mediastinum on CXR.  No evidence of pneumothorax or pneumonia on CXR.  No reproducible tenderness to palpation over the patient's chest wall.    Patient was observed in the ER with  well controlled symptoms.  Results of the above studies were discussed with the patient.  She is felt safe for discharge home with close outpatient follow-up.  Symptoms were improved and patient felt comfortable with the proposed plan of care. Order placed for outpatient stress test.  Recommended follow-up with PCP in 1-2 days for re-evaluation. Return to the ER with recurrent chest pain, shortness of breath, diahoresis, or any other new or troubling symptoms.  Questions of the patient answered prior to discharge.    Critical Care time:  none    Diagnosis:    ICD-10-CM    1. Acute chest pain R07.9        Disposition:  discharged to home    Scribe Disclosure:  I, Kelsi Graciela, am serving as a scribe on 8/7/2018 at 2:19 PM to personally document services performed by Gaudencio Dalton MD based on my observations and the provider's statements to me.     Kelsi Owens  8/7/2018   [facility] EMERGENCY DEPARTMENT       Gaudencio Dalton MD  08/08/18 0906

## 2018-08-07 NOTE — ED TRIAGE NOTES
"Constant right sided CP started late afternoon yesterday. States it feels \"Super tight\".   Nausea.  HA 7/10. States has a recent cold that has not cleared. States it has lasted for 6 weeks.   "

## 2018-08-08 LAB
INTERPRETATION ECG - MUSE: NORMAL
INTERPRETATION ECG - MUSE: NORMAL

## 2018-08-09 ENCOUNTER — HOSPITAL ENCOUNTER (OUTPATIENT)
Dept: CARDIOLOGY | Facility: CLINIC | Age: 48
Discharge: HOME | End: 2018-08-09
Attending: EMERGENCY MEDICINE | Admitting: EMERGENCY MEDICINE
Payer: COMMERCIAL

## 2018-08-09 DIAGNOSIS — R07.9 ACUTE CHEST PAIN: ICD-10-CM

## 2018-08-09 PROCEDURE — 93321 DOPPLER ECHO F-UP/LMTD STD: CPT | Mod: TC

## 2018-08-09 PROCEDURE — 93325 DOPPLER ECHO COLOR FLOW MAPG: CPT | Mod: 26 | Performed by: INTERNAL MEDICINE

## 2018-08-09 PROCEDURE — 93350 STRESS TTE ONLY: CPT | Mod: 26 | Performed by: INTERNAL MEDICINE

## 2018-08-09 PROCEDURE — 93321 DOPPLER ECHO F-UP/LMTD STD: CPT | Mod: 26 | Performed by: INTERNAL MEDICINE

## 2018-08-09 PROCEDURE — 25500064 ZZH RX 255 OP 636: Performed by: EMERGENCY MEDICINE

## 2018-08-09 PROCEDURE — 93018 CV STRESS TEST I&R ONLY: CPT | Performed by: INTERNAL MEDICINE

## 2018-08-09 PROCEDURE — 93016 CV STRESS TEST SUPVJ ONLY: CPT | Performed by: INTERNAL MEDICINE

## 2018-08-09 RX ADMIN — HUMAN ALBUMIN MICROSPHERES AND PERFLUTREN 9 ML: 10; .22 INJECTION, SOLUTION INTRAVENOUS at 15:58

## 2018-08-17 DIAGNOSIS — Z12.31 VISIT FOR SCREENING MAMMOGRAM: ICD-10-CM

## 2018-08-17 PROCEDURE — 77067 SCR MAMMO BI INCL CAD: CPT | Mod: TC

## 2018-10-17 ENCOUNTER — OFFICE VISIT (OUTPATIENT)
Dept: DERMATOLOGY | Facility: CLINIC | Age: 48
End: 2018-10-17
Payer: COMMERCIAL

## 2018-10-17 DIAGNOSIS — D48.5 NEOPLASM OF UNCERTAIN BEHAVIOR OF SKIN: Primary | ICD-10-CM

## 2018-10-17 ASSESSMENT — PAIN SCALES - GENERAL: PAINLEVEL: NO PAIN (0)

## 2018-10-17 NOTE — NURSING NOTE
Chief Complaint   Patient presents with     Derm Problem     spot on back of L leg, had it for at least a year and it is growing     Shamika Mao, EMT

## 2018-10-17 NOTE — MR AVS SNAPSHOT
After Visit Summary   10/17/2018    Roxie Funez    MRN: [id number]           Patient Information     Date Of Birth          1970        Visit Information        Provider Department      10/17/2018 7:30 AM Kev Robertson MD [facility] Dermatology        Today's Diagnoses     Neoplasm of uncertain behavior of skin    -  1       Follow-ups after your visit        Additional Services     DERMATOLOGY SURGERY REFERRAL       Result Note/Instructions:    Strong positive family history of melanoma. Patient would prefer complete excision/biopsy rather than staged punch/excision, which is reasonable. Likely benign dermatofibroma.                  Who to contact     Please call your clinic at [phone] to:    Ask questions about your health    Make or cancel appointments    Discuss your medicines    Learn about your test results    Speak to your doctor            Additional Information About Your Visit        [employer] Information     [employer] gives you secure access to your electronic health record. If you see a primary care provider, you can also send messages to your care team and make appointments. If you have questions, please call your primary care clinic.  If you do not have a primary care provider, please call [phone] and they will assist you.      [employer] ([employer]) is an electronic gateway that provides easy, online access to your medical records. With [employer]), you can request a clinic appointment, read your test results, renew a prescription or communicate with your care team.     To access your existing account, please contact your [facility] or call [phone] for assistance.        Care EveryWhere ID     This is your Care EveryWhere ID. This could be used by other organizations to access your [facility] medical records  [id number]         Blood Pressure from Last 3 Encounters:   08/07/18 139/86   10/31/17 140/86   09/10/15 120/82    Weight from Last 3  Encounters:   10/31/17 94.5 kg (208 lb 6.4 oz)   09/10/15 89.4 kg (197 lb)   08/28/14 90.7 kg (200 lb)              We Performed the Following     DERMATOLOGY SURGERY REFERRAL        Primary Care Provider Office Phone # Fax #    Sohail Borges [phone] [address] [place] [address]        Equal Access to Services     [facility]: Hadii aad ku hadasho Soomaali, waaxda luqadaha, qaybta kaalmada adeegyada, waxay idiin hayaan adeeg kharash la'aan ah. So [facility] [phone].    ATENCIÓN: Si ninfa avila, tiene a minor disposición servicios gratuitos de asistencia lingüística. Llame al [phone].    We comply with applicable federal civil rights laws and [place] laws. We do not discriminate on the basis of race, color, national origin, age, disability, sex, sexual orientation, or gender identity.            Thank you!     Thank you for choosing [facility] DERMATOLOGY  for your care. Our goal is always to provide you with excellent care. Hearing back from our patients is one way we can continue to improve our services. Please take a few minutes to complete the written survey that you may receive in the mail after your visit with us. Thank you!             Your Updated Medication List - Protect others around you: Learn how to safely use, store and throw away your medicines at www.disposemymeds.org.      Notice  As of 10/17/2018  8:16 AM    You have not been prescribed any medications.

## 2018-10-17 NOTE — LETTER
10/17/2018       RE: Roxie Funez  [address] [id number]     Dear Colleague,    Thank you for referring your patient, Roxie Funez, to the [facility] DERMATOLOGY at [facility]. Please see a copy of my visit note below.    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 Roxie Funez is a 47 year old presenting for a spot check.    Patient has a brown non-tender spot on her left posterior calf that has been there for about a year and she thinks is growing in size. It is not itchy, not oozing, and doesn't otherwise bother her.    She has a strong family of melanoma, including her father, brother, and grandfather. Also frequent sun exposure as a child, and tanning bed use.     She notes no other spots of concern.       Past Medical History:   Patient Active Problem List   Diagnosis     Major depressive disorder, single episode, mild (H)     Patellofemoral pain syndrome     Low back pain     Obesity     Raynaud's disease     Past Medical History:   Diagnosis Date     Lump or mass in breast     eval through [facility], with bx in 2/05.  Left upper pole mass.     Major depressive disorder, single episode, mild (H)     well-managed at this time off medications     Past Surgical History:   Procedure Laterality Date     BREAST LUMPECTOMY, RT/LT  2005    left     C LIGATE FALLOPIAN TUBE,POSTPARTUM  1997    Tubal Ligation     C NONSPECIFIC PROCEDURE  1999    Diagnostic Laparoscopy     C TOTAL ABDOM HYSTERECTOMY  5/2000    Hysterectomy, Total Abdominal     HC DILATION/CURETTAGE DIAG/THER NON OB  2000    D & C     HC REMOVAL OF OVARY/TUBE(S)   5/2000    Salpingo-Oophorectomy, Unilateral       HC REMOVAL OF TONSILS,12+ Y/O         Social History:   reports that she has never smoked. She has never used smokeless tobacco. She reports that she drinks about 12.0 oz of alcohol per week  She reports that she does not use illicit drugs.    Family History:  Family History   Problem Relation Age of Onset     Cancer Mother      ovarian at age 30, no other family hx of ovarian or breast ca     Diabetes Father      Skin Cancer Father      Melanoma Father      Skin Cancer Paternal Grandmother      Melanoma Paternal Grandmother      Diabetes Paternal Grandfather      Alcohol/Drug Brother      both parents also have Etoh issues     Breast Cancer No family hx of      Cancer - colorectal No family hx of      C.A.D. No family hx of      Eye Disorder No family hx of      no glaucoma       Medications:  No current outpatient prescriptions on file.        Allergies   Allergen Reactions     Sulfa Drugs          Review of Systems:  -As per HPI  -Constitutional: The patient denies fatigue, fevers, chills, unintended weight loss, and night sweats.  -HEENT: Patient denies nonhealing oral sores.  -Skin: As above in HPI. No additional skin concerns.    Physical exam:  Vitals: There were no vitals taken for this visit.  GEN: This is a well developed, well-nourished female in no acute distress, in a pleasant mood.    SKIN: Total body exam including face, chest, abdomen, back, extremities, and buttocks was completed.   - firm 1x1.5cm tan to brown dome-shaped plaque with pink peak, appears blood tinged.   - 3-4 flat dermatofibromas on right lower extremity  - small tan stuck on papule in right groin  -No other lesions of concern on areas examined.     Impression/Plan:  1. Neoplasm of uncertain behavior on the left posterior calf. The differential diagnosis dermatofibroma vs dermatofibrosarcoma includes seborrheic keratosis vs melanoma.     Will refer to derm surg for complete excisional  biopsy. Likely benign based on appearance, but given strong family history of melanoma and rapid growth, would prefer excisional biopsy rather than punch/revision.          Staff Physician Comments:  I saw and evaluated the patient with the resident and I agree with the assessment and plan as documented in the resident's note.    Kev Robertson MD  Professor  Head of Dermato-Allergy Division  Department of Dermatology  [facility]      Staff Involved:  Resident(Bert Cho)/Staff(as above)      Again, thank you for allowing me to participate in the care of your patient.      Sincerely,    Kev Robertson MD

## 2018-10-17 NOTE — PROGRESS NOTES
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 Roxie Funez is a 47 year old presenting for a spot check.    Patient has a brown non-tender spot on her left posterior calf that has been there for about a year and she thinks is growing in size. It is not itchy, not oozing, and doesn't otherwise bother her.    She has a strong family of melanoma, including her father, brother, and grandfather. Also frequent sun exposure as a child, and tanning bed use.     She notes no other spots of concern.       Past Medical History:   Patient Active Problem List   Diagnosis     Major depressive disorder, single episode, mild (H)     Patellofemoral pain syndrome     Low back pain     Obesity     Raynaud's disease     Past Medical History:   Diagnosis Date     Lump or mass in breast     eval through [facility], with bx in 2/05.  Left upper pole mass.     Major depressive disorder, single episode, mild (H)     well-managed at this time off medications     Past Surgical History:   Procedure Laterality Date     BREAST LUMPECTOMY, RT/LT  2005    left     C LIGATE FALLOPIAN TUBE,POSTPARTUM  1997    Tubal Ligation     C NONSPECIFIC PROCEDURE  1999    Diagnostic Laparoscopy     C TOTAL ABDOM HYSTERECTOMY  5/2000    Hysterectomy, Total Abdominal     HC DILATION/CURETTAGE DIAG/THER NON OB  2000    D & C     HC REMOVAL OF OVARY/TUBE(S)  5/2000    Salpingo-Oophorectomy, Unilateral       HC REMOVAL OF TONSILS,12+ Y/O         Social History:   reports that she has never smoked. She has never used smokeless tobacco. She reports that she drinks about 12.0 oz of alcohol per week  She reports that she does not use illicit  drugs.    Family History:  Family History   Problem Relation Age of Onset     Cancer Mother      ovarian at age 30, no other family hx of ovarian or breast ca     Diabetes Father      Skin Cancer Father      Melanoma Father      Skin Cancer Paternal Grandmother      Melanoma Paternal Grandmother      Diabetes Paternal Grandfather      Alcohol/Drug Brother      both parents also have Etoh issues     Breast Cancer No family hx of      Cancer - colorectal No family hx of      C.A.D. No family hx of      Eye Disorder No family hx of      no glaucoma       Medications:  No current outpatient prescriptions on file.        Allergies   Allergen Reactions     Sulfa Drugs          Review of Systems:  -As per HPI  -Constitutional: The patient denies fatigue, fevers, chills, unintended weight loss, and night sweats.  -HEENT: Patient denies nonhealing oral sores.  -Skin: As above in HPI. No additional skin concerns.    Physical exam:  Vitals: There were no vitals taken for this visit.  GEN: This is a well developed, well-nourished female in no acute distress, in a pleasant mood.    SKIN: Total body exam including face, chest, abdomen, back, extremities, and buttocks was completed.   - firm 1x1.5cm tan to brown dome-shaped plaque with pink peak, appears blood tinged.   - 3-4 flat dermatofibromas on right lower extremity  - small tan stuck on papule in right groin  -No other lesions of concern on areas examined.     Impression/Plan:  1. Neoplasm of uncertain behavior on the left posterior calf. The differential diagnosis dermatofibroma vs dermatofibrosarcoma includes seborrheic keratosis vs melanoma.     Will refer to derm surg for complete excisional biopsy. Likely benign based on appearance, but given strong family history of melanoma and rapid growth, would prefer excisional biopsy rather than punch/revision.          Staff Physician Comments:  I saw and evaluated the patient with the resident and I agree with the assessment  and plan as documented in the resident's note.    Kev Robertson MD  Professor  Head of Dermato-Allergy Division  Department of Dermatology  [facility], [place], [place]      Staff Involved:  Resident(Bert Cho)/Staff(as above)

## 2018-10-30 ENCOUNTER — OFFICE VISIT (OUTPATIENT)
Dept: DERMATOLOGY | Facility: CLINIC | Age: 48
End: 2018-10-30
Payer: COMMERCIAL

## 2018-10-30 VITALS — HEART RATE: 69 BPM | SYSTOLIC BLOOD PRESSURE: 146 MMHG | DIASTOLIC BLOOD PRESSURE: 85 MMHG

## 2018-10-30 DIAGNOSIS — D23.9 DERMATOFIBROMA: ICD-10-CM

## 2018-10-30 DIAGNOSIS — D48.5 NEOPLASM OF UNCERTAIN BEHAVIOR OF SKIN: Primary | ICD-10-CM

## 2018-10-30 ASSESSMENT — PAIN SCALES - GENERAL
PAINLEVEL: NO PAIN (0)
PAINLEVEL: NO PAIN (0)

## 2018-10-30 NOTE — LETTER
10/30/2018       RE: Roxie Funez  [address] [id number]     Dear Colleague,    Thank you for referring your patient, Roxie Funez, to the [facility] DERMATOLOGY at [facility]. Please see a copy of my visit note below.    DERMATOLOGIC SURGERY REPORT    NAME OF PROCEDURE:  EXCISION AND INTERMEDIATE CLOSURE    Surgeon:  Srinivasa Dubois    PREOPERATIVE DIAGNOSIS: Dermatofibroma  POSTOPERATIVE DIAGNOSIS: Same  FINAL EXCISION SIZE: 13mm lesion with 2mm margins  TOTAL EXCISED DIAMETER: 17mm  FINAL REPAIR LENGTH:  40mm    INDICATIONS:  This patient presented with a 13mm x 19mm brown and violaceous tumor on the L superior calf. Excision was indicated. We discussed the principles of treatment and most likely complications including bleeding, infection, wound dehiscence, pain, nerve damage, and scarring. Informed consent was obtained and the patient underwent the procedure as follows.    PROCEDURE:  The patient was taken to the operative suite. The treatment area was anesthetized with 1% lidocaine with 1:449238 epinephrine buffered with bicarbonate. The area was washed with Hibiclens, rinsed with saline and draped with sterile towels. The lesion was delineated and excised down to subcutaneous fat. Hemostasis was obtained by electrocoagulation. With a margin of 2mm, the final excision size was 17mm.      REPAIR:  In order to repair this defect while maintaining the normal anatomic relations and function, we elected to utilize an intermediate linear closure. Closure was oriented so that the wound was in the patient's natural skin tension lines. Deeper layers of the subcutaneous tissue were undermined first. Deep dermal and subcutaneous layer closure was performed using 3-0 Vicryl deep, intradermal and subcutaneous sutures. Two redundant columns were removed by triangulation. Final cutaneous approximation was achieved with 4-0 Prolene simple running sutures.     The  final wound length was 40mm.  A total of 6mL of anesthesia was administered for all surgical sites. Estimated blood loss was less than 10mL for all surgical sites. A sterile pressure dressing was applied and wound care instructions, with a written handout, were given. The [facility] was [facility] alert and ambulatory.    Srinivasa Dubois MD  Dermatology/Dermatopathology Staff Physician  , Department of Dermatology

## 2018-10-30 NOTE — PROGRESS NOTES
DERMATOLOGIC SURGERY REPORT    NAME OF PROCEDURE:  EXCISION AND INTERMEDIATE CLOSURE    Surgeon:  Srinivasa Dubois    PREOPERATIVE DIAGNOSIS: Dermatofibroma  POSTOPERATIVE DIAGNOSIS: Same  FINAL EXCISION SIZE: 13mm lesion with 2mm margins  TOTAL EXCISED DIAMETER: 17mm  FINAL REPAIR LENGTH:  40mm    INDICATIONS:  This patient presented with a 13mm x 19mm brown and violaceous tumor on the L superior calf. Excision was indicated. We discussed the principles of treatment and most likely complications including bleeding, infection, wound dehiscence, pain, nerve damage, and scarring. Informed consent was obtained and the patient underwent the procedure as follows.    PROCEDURE:  The patient was taken to the operative suite. The treatment area was anesthetized with 1% lidocaine with 1:763626 epinephrine buffered with bicarbonate. The area was washed with Hibiclens, rinsed with saline and draped with sterile towels. The lesion was delineated and excised down to subcutaneous fat. Hemostasis was obtained by electrocoagulation. With a margin of 2mm, the final excision size was 17mm.      REPAIR:  In order to repair this defect while maintaining the normal anatomic relations and function, we elected to utilize an intermediate linear closure. Closure was oriented so that the wound was in the patient's natural skin tension lines. Deeper layers of the subcutaneous tissue were undermined first. Deep dermal and subcutaneous layer closure was performed using 3-0 Vicryl deep, intradermal and subcutaneous sutures. Two redundant columns were removed by triangulation. Final cutaneous approximation was achieved with 4-0 Prolene simple running sutures.     The final wound length was 40mm.  A total of 6mL of anesthesia was administered for all surgical sites. Estimated blood loss was less than 10mL for all surgical sites. A sterile pressure dressing was applied and wound care instructions, with a written handout, were given. The patient was  discharged from the [facility] alert and ambulatory.    Srinivasa Dubois MD  Dermatology/Dermatopathology Staff Physician  , Department of Dermatology

## 2018-10-30 NOTE — NURSING NOTE
Lidocaine-epinephrine 1-1:046581 % injection   4mL once for one use, starting 10/30/2018 ending 10/30/2018,  2mL disp, R-0, injection  Injected by TRAV Suarez

## 2018-10-30 NOTE — MR AVS SNAPSHOT
After Visit Summary   10/30/2018    Roxie Funez    MRN: [id number]           Patient Information     Date Of Birth          1970        Visit Information        Provider Department      10/30/2018 5:35 PM Srinivasa Dubois MD [facility] Dermatology        Care Instructions    Excision Wound Care Instructions  I will experience scar, altered skin color, bleeding, swelling, pain, crusting and redness. I may experience altered sensation. Risks are excessive bleeding, infection, muscle weakness, thick (hypertrophic or keloidal) scar, and recurrence,. A second procedure may be recommended to obtain the best cosmetic or functional result.  Possible complications of any surgical procedure are bleeding, infection, scarring, alteration in skin color and sensation, muscle weakness in the area, wound dehiscence or seperation, or recurrence of the lesion or disease. On occasion, after healing, a secondary procedure or revision may be recommended in order to obtain the best cosmetic or functional result.   After your surgery, a pressure bandage will be placed over the area that has sutures. This will help prevent bleeding. Please follow these instructions until you come back to clinic for suture removal on 14 days, as they will help you to prevent complications as your wound heals.  For the First 48 hours After Surgery:  1. Leave the pressure bandage on and keep it dry. If it should come loose, you may retape it, but do not take it off.  2. Relax and take it easy. Do not do any vigorous exercise, heavy lifting, or bending forward. This could cause the wound to bleed.  3. Post-operative pain is usually mild. You may take plain or extra strength Tylenol every 4 hours as needed (do not take more than 4,000mg in one day). Do not take any medicine that contains aspirin, ibuprofen or motrin unless you have been recommended these by a doctor.  Avoid alcohol and vitamin E as these may increase your tendency to  bleed.  4. You may put an ice pack around the bandaged area for 20 minutes every 2-3 hours. This may help reduce swelling, bruising, and pain. Make sure the ice pack is waterproof so that the pressure bandage does not get wet.   5. You may see a small amount of drainage or blood on your pressure bandage. This is normal. However, if drainage or bleeding continues or saturates the bandage, you will need to apply firm pressure over the bandage with a washcloth for 15 minutes. If bleeding continues after applying pressure for 15 minutes then go to the nearest emergency room.  48 Hours After Surgery  Carefully remove the bandage and start daily wound care and dressing changes. You may also now shower and get the wound wet. Wash wound with a mild soap and water.  Use caution when washing the wound. Be gentle and do not let the forceful shower stream hit the wound directly.  PAT dry.  Daily Wound Care:  1. Wash wound with a mild soap and water.  Use caution when washing the wound, be gentle and do not let the forceful shower stream hit the wound directly.  2. PAT DRY.  3. Apply Vaseline (from a new container or tube) over the suture line with a Q-tip. It is very important to keep the wound continuously moist, as wounds heal best in a moist environment.  4.  Keep the site covered until sutures are removed, you can cover it with a Telfa (non-stick) dressing and tape or a band-aid.    5. If you are unable to keep wound covered, you must apply Vaseline every 2 - 3 hours (while awake) to ensure it is being kept moist for optimal healing. A dressing overnight is recommended to keep the area moist.   Call Us If:  1. You have pain that is not controlled with Tylenol.  2. You have signs or symptoms of an infection, such as: fever over 100 degrees F, redness, warmth, or foul-smelling or yellow/creamy drainage from the wound.  Who should I call with questions?    [facility]: [phone]      [facility]: [phone]    For urgent needs outside of business hours call the [facility] at [phone] and ask for the dermatology resident on call              Follow-ups after your visit        Who to contact     Please call your clinic at [phone] to:    Ask questions about your health    Make or cancel appointments    Discuss your medicines    Learn about your test results    Speak to your doctor            Additional Information About Your Visit        [employer] Information     "[employer] gives you secure access to your electronic health record. If you see a primary care provider, you can also send messages to your care team and make appointments. If you have questions, please call your primary care clinic.  If you do not have a primary care provider, please call [phone] and they will assist you.      "[employer] is an electronic gateway that provides easy, online access to your medical records. With "[employer] you can request a clinic appointment, read your test results, renew a prescription or communicate with your care team.     To access your existing account, please contact your [facility] or call [phone] for assistance.        Care EveryWhere ID     This is your Care EveryWhere ID. This could be used by other organizations to access your [place] medical records  [id number]        Your Vitals Were     Pulse                   69            Blood Pressure from Last 3 Encounters:   10/30/18 146/85   08/07/18 139/86   10/31/17 140/86    Weight from Last 3 Encounters:   10/31/17 94.5 kg (208 lb 6.4 oz)   09/10/15 89.4 kg (197 lb)   08/28/14 90.7 kg (200 lb)              Today, you had the following     No orders found for display       Primary Care Provider Office Phone # Fax #    Sohail Borges [phone] [address] [place] [address]        Equal Access to Services     AGUSTÍN LLOYD: Philip  nichelle Melendez, karen arreola, george gongmajuice lemaantwanjuice, srikanth gaffneykayandrzej joseph gianni. So [facility] [phone].    ATENCIÓN: Si habla español, tiene a minor disposición servicios gratuitos de asistencia lingüística. Llame al [phone].    We comply with applicable federal civil rights laws and [place] laws. We do not discriminate on the basis of race, color, national origin, age, disability, sex, sexual orientation, or gender identity.            Thank you!     Thank you for choosing [facility] DERMATOLOGY  for your care. Our goal is always to provide you with excellent care. Hearing back from our patients is one way we can continue to improve our services. Please take a few minutes to complete the written survey that you may receive in the mail after your visit with us. Thank you!             Your Updated Medication List - Protect others around you: Learn how to safely use, store and throw away your medicines at www.disposemymeds.org.      Notice  As of 10/30/2018  5:59 PM    You have not been prescribed any medications.

## 2018-10-30 NOTE — PATIENT INSTRUCTIONS

## 2018-10-30 NOTE — NURSING NOTE
Chief Complaint   Patient presents with     Derm Problem     Roxie is here today for an excision on her left posterior calf .     UCHE SuarezA

## 2018-11-02 LAB — COPATH REPORT: NORMAL

## 2019-10-01 ENCOUNTER — HEALTH MAINTENANCE LETTER (OUTPATIENT)
Age: 49
End: 2019-10-01

## 2020-05-14 ENCOUNTER — VIRTUAL VISIT (OUTPATIENT)
Dept: FAMILY MEDICINE | Facility: OTHER | Age: 50
End: 2020-05-14

## 2020-05-14 NOTE — PROGRESS NOTES
"Date: 2020 08:23:12  Clinician: Hailey Manzano  Clinician NPI: [id number]  Patient: Roxie Funez  Patient : 1970  Patient Address: [address] [place] [address]  Patient Phone: [phone]  Visit Protocol: URI  Patient Summary:  Roxie is a 49 year old ( : 1970 ) female who initiated a Visit for COVID-19 (Coronavirus) evaluation and screening. When asked the question \"Please sign me up to receive news, health information and promotions from [facility].\", Roxie responded \"No\".    Roxie states her symptoms started gradually 3-4 days ago.   Her symptoms consist of a cough, nausea, chills, ear pain, a headache, malaise, myalgia, a sore throat, and enlarged lymph nodes. She is experiencing mild difficulty breathing with activities but can speak normally in full sentences.   Symptom details     Cough: Roxie coughs a few times an hour and her cough is more bothersome at night. Phlegm does not come into her throat when she coughs. She does not believe her cough is caused by post-nasal drip.     Sore throat: Roxie reports having moderate throat pain (4-6 on a 10 point pain scale), does not have exudate on her tonsils, and can swallow liquids. The lymph nodes in her neck are enlarged. A rash has not appeared on the skin since the sore throat started.     Headache: She states the headache is moderate (4-6 on a 10 point pain scale).      Roxie denies having nasal congestion, vomiting, teeth pain, ageusia, diarrhea, facial pain or pressure, rhinitis, anosmia, wheezing, and fever. She also denies double sickening (worsening symptoms after initial improvement), taking antibiotic medication for the symptoms, and having recent facial or sinus surgery in the past 60 days.   Precipitating events  Within the past week, Roxie has not been exposed to someone with strep throat. She has not recently been exposed to someone with influenza. Roxie has not been in close contact " with any high risk individuals.   Pertinent COVID-19 (Coronavirus) information  In the past 14 days, Roxie has not worked in a congregate living setting.   She does not work or volunteer as healthcare worker or a  and does not work or volunteer in a healthcare facility.   Roxie also has not lived in a congregate living setting in the past 14 days. She does not live with a healthcare worker.   Roxie has not had a close contact with a laboratory-confirmed COVID-19 patient within 14 days of symptom onset.   Pertinent medical history  Roxie does not get yeast infections when she takes antibiotics.   Roxie needs a return to work/school note.   Weight: 195 lbs   Roxie does not smoke or use smokeless tobacco.   She denies pregnancy and denies breastfeeding. She does not menstruate.   Weight: 195 lbs    MEDICATIONS: No current medications, ALLERGIES: Sulfa (Sulfonamide Antibiotics)  Clinician Response:  Dear Roxie,   Dear [patient_first_name  Your symptoms show that you may have coronavirus (COVID-19). This illness can cause fever, cough and trouble breathing. Many people get a mild case and get better on their own. Some people can get very sick.  Will I be tested for COVID-19?  Not all patients are tested for COVID-19. If you need to be tested, your care team will let you know. You may request testing if:   You are very ill. For example, you're on chemotherapy, dialysis or home hospice care. (Contact your specialty clinic or program.)   You live in a nursing home or other long-term care facility. (Talk to your nurse manager or medical director.)   You're a health care worker. (Contact your employee health office.)   How can I protect others?  Without a test, we can't know for sure that you have COVID-19. For safety, it's very important to follow these rules.  First, stay home and away from others (self-isolate) until:   You've had no fever---and no medicine that reduces  "fever---for 3 full days (72 hours). And...    Your other symptoms have gotten better. For example, your cough or breathing has improved. And...   At least 10 days have passed since your symptoms started.   During this time:   Stay in your own room (and use your own bathroom), if you can.   Stay away from others in your home. No hugging, kissing or shaking hands.   Don't let anyone visit.   Don't go to work, school or anywhere else.    Clean \"high touch\" surfaces often (doorknobs, counters, handles, etc.). Use a household cleaning spray or wipes.   Cover your mouth and nose with a mask, tissue or washcloth to avoid spreading germs.   Wash your hands and face often. Use soap and water.   How can I take care of myself?   1.Get lots of rest. Drink extra fluids (unless a doctor has told you not to).                  2.Take Tylenol (acetaminophen) for fever or pain. If you have liver or kidney problems, ask your family doctor if it's okay to take Tylenol.        Adults can take either:    650 mg (two 325 mg pills) every 4 to 6 hours, or...   1,000 mg (two 500 mg pills) every 8 hours as needed.    Note: Don't take more than 3,000 mg in one day. Acetaminophen is found in many medicines (both prescribed and over-the-counter medicines). Read all labels to be sure you don't take too much.    For children, check the Tylenol bottle for the right dose. The dose is based on the child's age or weight.   3.If you have other health problems (like cancer, heart failure, an organ transplant or severe kidney disease): Call your specialty clinic if you don't feel better in the next 2 days.       4.Know when to call 911: If your breathing is so bad that it keeps you from doing normal activities, call 911 or go to the emergency room. Tell them that you've been staying home and may have COVID-19.       5.Sign up for [facility]. We know it's scary to hear that you might have COVID-19. We want to track your symptoms to make sure you're okay " over the next 2 weeks. Please look for an email from [employer]---this is a free, online program that we'll use to keep in touch. To sign up, follow the link in the email. Learn more at [email or web address].   Where can I get more information?  For more about COVID-19 and caring for yourself at home, please visit the CDC website at https://www.cdc.gov/coronavirus/2019-ncov/about/steps-when-sick.html.  To learn about care at [facility], please visit [email or web address]         If you'd like to be part of a COVID-19 clinical trial (research study) at the [facility], go to [email or web address] for details.     COVID-19 (Coronavirus) General Information  Because there is currently no vaccine to prevent infection, the best way to protect yourself is to avoid being exposed to this virus. Common symptoms of COVID-19 include but are not limited to fever, cough, and shortness of breath. These symptoms appear 2-14 days after you are exposed to the virus that causes COVID-19. Click here for more information from the CDC on how to protect yourself.  If you are sick with COVID-19 or suspect you are infected with the virus that causes COVID-19, follow the steps here from the CDC to help prevent the disease from spreading to people in your home and community.  Click here for general information from the CDC on testing.  If you develop any of these emergency warning signs for COVID-19, get medical attention immediately:     Trouble breathing    Persistent pain or pressure in the chest    New confusion or inability to arouse    Bluish lips or face      Call your doctor or clinic before going in. Call 911 if you have a medical emergency and notify the  you have or think you may have COVID-19.  For more detailed and up to date information on COVID-19 (Coronavirus), please visit the CDC website.   Diagnosis: Cough  Diagnosis ICD: R05

## 2020-05-15 ENCOUNTER — NURSE TRIAGE (OUTPATIENT)
Dept: NURSING | Facility: CLINIC | Age: 50
End: 2020-05-15

## 2020-05-15 ENCOUNTER — VIRTUAL VISIT (OUTPATIENT)
Dept: URGENT CARE | Facility: CLINIC | Age: 50
End: 2020-05-15
Payer: COMMERCIAL

## 2020-05-15 ENCOUNTER — HOSPITAL ENCOUNTER (EMERGENCY)
Facility: CLINIC | Age: 50
Discharge: HOME | End: 2020-05-15
Attending: EMERGENCY MEDICINE | Admitting: EMERGENCY MEDICINE
Payer: COMMERCIAL

## 2020-05-15 ENCOUNTER — APPOINTMENT (OUTPATIENT)
Dept: GENERAL RADIOLOGY | Facility: CLINIC | Age: 50
End: 2020-05-15
Attending: EMERGENCY MEDICINE
Payer: COMMERCIAL

## 2020-05-15 ENCOUNTER — OFFICE VISIT (OUTPATIENT)
Dept: URGENT CARE | Facility: URGENT CARE | Age: 50
End: 2020-05-15
Payer: COMMERCIAL

## 2020-05-15 VITALS
HEART RATE: 64 BPM | TEMPERATURE: 99 F | OXYGEN SATURATION: 97 % | SYSTOLIC BLOOD PRESSURE: 132 MMHG | DIASTOLIC BLOOD PRESSURE: 86 MMHG

## 2020-05-15 VITALS
TEMPERATURE: 97.8 F | SYSTOLIC BLOOD PRESSURE: 140 MMHG | WEIGHT: 200 LBS | HEART RATE: 67 BPM | OXYGEN SATURATION: 98 % | DIASTOLIC BLOOD PRESSURE: 87 MMHG | RESPIRATION RATE: 16 BRPM | BODY MASS INDEX: 34.87 KG/M2

## 2020-05-15 DIAGNOSIS — Z20.822 SUSPECTED COVID-19 VIRUS INFECTION: ICD-10-CM

## 2020-05-15 DIAGNOSIS — R06.02 SHORTNESS OF BREATH: ICD-10-CM

## 2020-05-15 DIAGNOSIS — B34.9 VIRAL SYNDROME: ICD-10-CM

## 2020-05-15 DIAGNOSIS — R07.9 CHEST PAIN, UNSPECIFIED TYPE: Primary | ICD-10-CM

## 2020-05-15 DIAGNOSIS — R07.9 CHEST PAIN, UNSPECIFIED TYPE: ICD-10-CM

## 2020-05-15 DIAGNOSIS — R06.00 DYSPNEA, UNSPECIFIED TYPE: ICD-10-CM

## 2020-05-15 LAB
ANION GAP SERPL CALCULATED.3IONS-SCNC: 4 MMOL/L (ref 3–14)
BASOPHILS # BLD AUTO: 0.1 10E9/L (ref 0–0.2)
BASOPHILS NFR BLD AUTO: 0.7 %
BUN SERPL-MCNC: 14 MG/DL (ref 7–30)
CALCIUM SERPL-MCNC: 9.3 MG/DL (ref 8.5–10.1)
CHLORIDE SERPL-SCNC: 106 MMOL/L (ref 94–109)
CO2 SERPL-SCNC: 28 MMOL/L (ref 20–32)
CREAT SERPL-MCNC: 0.65 MG/DL (ref 0.52–1.04)
DIFFERENTIAL METHOD BLD: ABNORMAL
EOSINOPHIL # BLD AUTO: 0.2 10E9/L (ref 0–0.7)
EOSINOPHIL NFR BLD AUTO: 2 %
ERYTHROCYTE [DISTWIDTH] IN BLOOD BY AUTOMATED COUNT: 12.2 % (ref 10–15)
GFR SERPL CREATININE-BSD FRML MDRD: >90 ML/MIN/{1.73_M2}
GLUCOSE SERPL-MCNC: 92 MG/DL (ref 70–99)
HCT VFR BLD AUTO: 47.9 % (ref 35–47)
HGB BLD-MCNC: 15.7 G/DL (ref 11.7–15.7)
IMM GRANULOCYTES # BLD: 0 10E9/L (ref 0–0.4)
IMM GRANULOCYTES NFR BLD: 0.4 %
LYMPHOCYTES # BLD AUTO: 2.6 10E9/L (ref 0.8–5.3)
LYMPHOCYTES NFR BLD AUTO: 34.2 %
MCH RBC QN AUTO: 31 PG (ref 26.5–33)
MCHC RBC AUTO-ENTMCNC: 32.8 G/DL (ref 31.5–36.5)
MCV RBC AUTO: 95 FL (ref 78–100)
MONOCYTES # BLD AUTO: 0.5 10E9/L (ref 0–1.3)
MONOCYTES NFR BLD AUTO: 6.2 %
NEUTROPHILS # BLD AUTO: 4.3 10E9/L (ref 1.6–8.3)
NEUTROPHILS NFR BLD AUTO: 56.5 %
NRBC # BLD AUTO: 0 10*3/UL
NRBC BLD AUTO-RTO: 0 /100
PLATELET # BLD AUTO: 304 10E9/L (ref 150–450)
POTASSIUM SERPL-SCNC: 3.7 MMOL/L (ref 3.4–5.3)
RBC # BLD AUTO: 5.07 10E12/L (ref 3.8–5.2)
SODIUM SERPL-SCNC: 138 MMOL/L (ref 133–144)
TROPONIN I SERPL-MCNC: <0.015 UG/L (ref 0–0.04)
WBC # BLD AUTO: 7.6 10E9/L (ref 4–11)

## 2020-05-15 PROCEDURE — 99285 EMERGENCY DEPT VISIT HI MDM: CPT | Mod: 25

## 2020-05-15 PROCEDURE — 99207 ZZC NO BILLABLE SERVICE THIS VISIT: CPT | Mod: 95

## 2020-05-15 PROCEDURE — 25000132 ZZH RX MED GY IP 250 OP 250 PS 637: Performed by: EMERGENCY MEDICINE

## 2020-05-15 PROCEDURE — 94640 AIRWAY INHALATION TREATMENT: CPT

## 2020-05-15 PROCEDURE — 85025 COMPLETE CBC W/AUTO DIFF WBC: CPT | Performed by: EMERGENCY MEDICINE

## 2020-05-15 PROCEDURE — 99215 OFFICE O/P EST HI 40 MIN: CPT | Performed by: FAMILY MEDICINE

## 2020-05-15 PROCEDURE — 87635 SARS-COV-2 COVID-19 AMP PRB: CPT | Performed by: EMERGENCY MEDICINE

## 2020-05-15 PROCEDURE — 71045 X-RAY EXAM CHEST 1 VIEW: CPT

## 2020-05-15 PROCEDURE — 93005 ELECTROCARDIOGRAM TRACING: CPT

## 2020-05-15 PROCEDURE — 80048 BASIC METABOLIC PNL TOTAL CA: CPT | Performed by: EMERGENCY MEDICINE

## 2020-05-15 PROCEDURE — 84484 ASSAY OF TROPONIN QUANT: CPT | Performed by: EMERGENCY MEDICINE

## 2020-05-15 RX ORDER — ALBUTEROL SULFATE 90 UG/1
2 AEROSOL, METERED RESPIRATORY (INHALATION) EVERY 4 HOURS PRN
Qty: 1 INHALER | Refills: 0 | Status: SHIPPED | OUTPATIENT
Start: 2020-05-15 | End: 2020-05-15

## 2020-05-15 RX ORDER — BENZONATATE 200 MG/1
200 CAPSULE ORAL 3 TIMES DAILY PRN
Qty: 21 CAPSULE | Refills: 0 | Status: SHIPPED | OUTPATIENT
Start: 2020-05-15 | End: 2020-05-15

## 2020-05-15 RX ORDER — ALBUTEROL SULFATE 90 UG/1
2 AEROSOL, METERED RESPIRATORY (INHALATION) EVERY 4 HOURS PRN
Qty: 1 INHALER | Refills: 0 | Status: SHIPPED | OUTPATIENT
Start: 2020-05-15 | End: 2022-10-03

## 2020-05-15 RX ORDER — BENZONATATE 200 MG/1
200 CAPSULE ORAL 3 TIMES DAILY PRN
Qty: 21 CAPSULE | Refills: 0 | Status: SHIPPED | OUTPATIENT
Start: 2020-05-15 | End: 2022-10-03

## 2020-05-15 RX ORDER — ALBUTEROL SULFATE 90 UG/1
6 AEROSOL, METERED RESPIRATORY (INHALATION) ONCE
Status: COMPLETED | OUTPATIENT
Start: 2020-05-15 | End: 2020-05-15

## 2020-05-15 RX ADMIN — ALBUTEROL SULFATE 6 PUFF: 90 AEROSOL, METERED RESPIRATORY (INHALATION) at 20:27

## 2020-05-15 ASSESSMENT — ENCOUNTER SYMPTOMS
DIARRHEA: 0
CHEST TIGHTNESS: 1
SHORTNESS OF BREATH: 1
SORE THROAT: 1
HEADACHES: 1
FEVER: 0
FATIGUE: 1
MYALGIAS: 1
NAUSEA: 0
COUGH: 1
SHORTNESS OF BREATH: 1
MYALGIAS: 1
DIARRHEA: 0
COUGH: 1
HEADACHES: 1
CHEST TIGHTNESS: 1
FEVER: 0
SORE THROAT: 1
VOMITING: 0

## 2020-05-15 NOTE — TELEPHONE ENCOUNTER
"S: Breathing and chest tightness.  B: Has not been feeling well since 5/10. Yesterday spoke with on care provider.  Was instructed to call back if symptoms got worse.  Today her symptoms are HA, sore throat, dry cough, fatigue, body aches, chest pain rates \"7\", and breathing feels tight.  She is having more difficulties with chest pain and breathing today.  Is able to speak in complete sentences without being SOB.  Chest pain feels like \"an elephant is standing on my chest\". Lips are not bluish.  A: She does not want to go into a health care facility where there are \" sick people\".  Willing to talk with a urgent care provider.  R: Transferred to scheduling to get a appointment today with an urgent care provider.  Luciana Dhillon RN, [facility]    COVID 19 Nurse Triage Plan/Patient Instructions    Please be aware that novel coronavirus (COVID-19) may be circulating in the community. If you develop symptoms such as fever, cough, or SOB or if you have concerns about the presence of another infection including coronavirus (COVID-19), please contact your health care provider or visit [email or web address].     Disposition/Instructions    Patient to have an Urgent Care Telephone Visit with a provider. Follow System Ambulatory Workflow for COVID 19.     Urgent Care Telephone Visits are available between the hours of 8 am to 9 pm. Staff will assist patent in scheduling an appointment for this Urgent Care Telephone Visit.     Call Back If: Your symptoms worsen before you are able to complete your Urgent Care Telephone Visit with a provider.    Thank you for limiting contact with others, wearing a simple mask to cover your cough, practice good hand hygiene habits and accessing our [employer] services where possible to limit the spread of this virus.    For more information about COVID19 and options for caring for yourself at home, please visit the CDC website at " https://www.cdc.gov/coronavirus/2019-ncov/about/steps-when-sick.html  For more options for care at [facility], please visit our website at [email or web address]    For more information, please use the [place] Department of Health COVID-19 Website: https://www.health.[facility].[place]/diseases/coronavirus/index.html  [place] Department of Health ([facility]) COVID-19 Hotlines (Interpreters available):      Health questions: Phone Number: [phone] or 1-713.987.9213 and Hours: 7 a.m. to 7 p.m.    Schools and  questions: Phone Number: [phone] or [phone] and Hours 7 a.m. to 7 p.m.                  Reason for Disposition    Chest pain    Additional Information    Negative: SEVERE difficulty breathing (e.g., struggling for each breath, speaks in single words)    Negative: Difficult to awaken or acting confused (e.g., disoriented, slurred speech)    Negative: Bluish (or gray) lips or face now    Negative: Shock suspected (e.g., cold/pale/clammy skin, too weak to stand, low BP, rapid pulse)    Negative: Sounds like a life-threatening emergency to the triager    Negative: SEVERE or constant chest pain (Exception: mild central chest pain, present only when coughing)    Negative: MODERATE difficulty breathing (e.g., speaks in phrases, SOB even at rest, pulse 100-120)    Negative: Patient sounds very sick or weak to the triager    Negative: MILD difficulty breathing (e.g., minimal/no SOB at rest, SOB with walking, pulse <100)    Protocols used: CORONAVIRUS (COVID-19) DIAGNOSED OR [id number]-OD 4.22.20

## 2020-05-15 NOTE — ED AVS SNAPSHOT
[facility] Emergency Department  [address]  [facility] [address]  Phone:  [phone]  Fax:  [phone]                                    Roxie Funez   MRN: [id number]    Department:  [facility] Emergency Department   Date of Visit:  5/15/2020           After Visit Summary Signature Page    I have received my discharge instructions, and my questions have been answered. I have discussed any challenges I see with this plan with the nurse or doctor.    ..........................................................................................................................................  Patient/Patient Representative Signature      ..........................................................................................................................................  Patient Representative Print Name and Relationship to Patient    ..................................................               ................................................  Date                                   Time    ..........................................................................................................................................  Reviewed by Signature/Title    ...................................................              ..............................................  Date                                               Time          22EPIC Rev 08/18

## 2020-05-15 NOTE — PROGRESS NOTES
SUBJECTIVE:   Roxie Funez is a 49 year old female presenting with a chief complaint of   Chief Complaint   Patient presents with     Chest Pain     x 1 week     Breathing Problem     x 1 week     Patient consent to phone visit    She is an established patient of [facility].    URI Adult    Onset of symptoms was 6 days ago.  Course of illness is worsening.    Severity moderate  Current and Associated symptoms: chest pain, chest tightness, shortness of breath, HA, fatigue, sore throat, dry cough. Having more chest pain and shortness of breath today. SOB worse with activities. No recent chest trauma or injury. No new medication.  Denies fever. No v/d.  Treatment measures tried include Fluids and Rest.  Predisposing factors include None.  No known contact with PUI or confirmed positive Covid-19. Has been working from home since March. No sick contacts at home.   Had oncare visit yesterday and referred to virtual urgent care today due to worsening symptoms.       Review of Systems   Constitutional: Negative for fever.   HENT: Positive for sore throat.    Respiratory: Positive for cough, chest tightness and shortness of breath.    Cardiovascular: Positive for chest pain.   Gastrointestinal: Negative for diarrhea and nausea.   Musculoskeletal: Positive for myalgias.   Neurological: Positive for headaches.       Past Medical History:   Diagnosis Date     Lump or mass in breast     eval through [facility], with bx in 2/05.  Left upper pole mass.     Major depressive disorder, single episode, mild (H)     well-managed at this time off medications     Family History   Problem Relation Age of Onset     Cancer Mother         ovarian at age 30, no other family hx of ovarian or breast ca     Diabetes Father      Skin Cancer Father      Melanoma Father      Skin Cancer Paternal Grandmother      Melanoma Paternal Grandmother      Diabetes Paternal Grandfather      Alcohol/Drug Brother         both parents also have Etoh issues      Breast Cancer No family hx of      Cancer - colorectal No family hx of      C.A.D. No family hx of      Eye Disorder No family hx of         no glaucoma     No current outpatient medications on file.     Social History     Tobacco Use     Smoking status: Never Smoker     Smokeless tobacco: Never Used   Substance Use Topics     Alcohol use: Yes     Alcohol/week: 20.0 standard drinks     Comment: is cautious about intake due to family history       OBJECTIVE  There were no vitals taken for this visit.    Physical Exam   In no acute distress   Sporadic cough throughout phone interview    Labs:  No results found for this or any previous visit (from the past 24 hour(s)).        ASSESSMENT:      ICD-10-CM    1. Chest pain, unspecified type  R07.9    2. Shortness of breath  R06.02           PLAN:    Chest pain, unspecified: symptoms ongoing for the past 6 days and seems worse today. No recent chest trauma or injury. Patient reports a lot of chest pressure today. I have recommended a face to face visit to further evaluate. She agrees.   Shortness of breath: Advised face to face urgent care visit due to worsening symptoms. Number provided to call and schedule appointment. Might need CXR. She doesn't qualify for Covid-19 testing per our criteria. Patient agrees.      Time: 10 min

## 2020-05-15 NOTE — PROGRESS NOTES
THIS IS A TRIAGE ENCOUNTER.   Roxie Funez is a 49 year old female presenting with a chief complaint of one week of worsening shortness of breath (worse today) and chest pain (at the midline), chest tightness, headache, fatigue, sore throat, dry cough,  generalized muscle aches.      Patient will go now to the [facility] emergency room for possible COVID-19 and to rule out PE and MI, since the [facility] does not have timely troponin and D-Dimer labs.         Rocky Silva MD

## 2020-05-16 LAB
SARS-COV-2 RNA SPEC QL NAA+PROBE: NOT DETECTED
SPECIMEN SOURCE: NORMAL

## 2020-05-16 NOTE — ED TRIAGE NOTES
Starting Monday pt c/o sore thoat, ear ache, headache, right neck swollen glands, body aches.  Now pt having more coughing and shortness of breath.

## 2020-05-16 NOTE — DISCHARGE INSTRUCTIONS
Discharge Instructions  COVID-19    Your Provider has determined that you should practice self-isolation and self-monitoring in order to protect yourself and your community from COVID-19, which is the disease caused by a new coronavirus. The virus spreads from person to person primarily by droplets when an infected person coughs or sneezes and the droplet either lands on another person or that other person touches a surface with the droplet on it. Diagnosis of COVID-19 can be made with a test but many times the test is unavailable or not necessary. There is no specific treatment or medicine for the disease.    Symptoms of COVID-19  Many people have no symptoms or mild symptoms.  Symptoms may usually appear 4 to 5 days (up to 14 days) after contact with another ill person. Some people will get severe symptoms and pneumonia. Usual symptoms are:     Fever    Cough   Trouble breathing   Less common symptoms are: Headache, body aches, sore    throat, sneezing, diarrhea.    How to Care for Yourself    Stay home.  Most people will recover from illness with mild symptoms.  Isolation by staying home is the best method to prevent the spread of the illness. Do not go to work or school. Have a friend or relative do your shopping. Do not use public transportation (bus, train) or ridesharing (Lyft, Uber).    How long should I stay home?  If you have symptoms of a respiratory disease (fever, cough), you should stay home for at least 7 days, and for 3 days with no fever and improvement of respiratory symptoms--whichever is longer. (Your fever should be gone for 3 days without using fever-reducing medicine.)    For example, if you have a fever and coughing for 4 days, you need to stay home 3 more days with no fever for a total of 7 days. Or, if you have a fever and coughing for 5 days, you need to stay home 3 more days with no fever for a total of 8 days.    Separate yourself from other people in your home.?As much as possible, you  should stay in one room and away from other people in your home. Also, use a separate bathroom, if possible. Avoid handling pets or other animals while sick.     Wear a facemask if you need to be around other people and cover your mouth and nose with a tissue when you cough or sneeze.     Avoid sharing personal household items. You should not share dishes, drinking glasses, forks/knives/spoons, towels, or bedding with other people in your home. After using these items, they should be washed with soap and water. Clean parts of your home that are touched often (doorknobs, faucets, countertops, etc.) daily.     Wash your hands often with soap and water for at least 20 seconds or use an alcohol-based hand  containing at least 60% alcohol.     Avoid touching your face.    Treat your symptoms: Take Acetaminophen (Tylenol) to treat body aches and fever as needed for comfort. Ibuprofen (Advil or Motrin) can be used as well if you still have symptoms after taking Tylenol.  Drink fluids. Rest.    Watch for worsening symptoms, shortness of breath, or difficulty   Breathing.    Employers/workplaces are being asked by the Centers for Disease Control (CDC) to not request notes/documentation for you to return to work or prove that you were ill. You may choose to show your employer this paperwork.    Return to the Emergency Department if:    If you are developing worsening breathing, shortness of breath, or feel worse you should seek medical attention.  If you are uncertain, contact your health care provider/clinic. If you need emergency medical attention, call 911 and tell them you have been ill.

## 2020-05-16 NOTE — ED PROVIDER NOTES
"  History     Chief Complaint:    Cough    The history is provided by the patient.      Roxie Funez is a 49 year old female who presents with a cough. The patient's symptoms began 5 days ago with a sore throat, bilateral ear pain, myalgias, fatigue, and a headache. The following day she developed chest pain. In the last few days she has been experiencing shortness of breath, chest tightness, and a non productive cough. The patient describes the chest pain as \"chest heaviness.\" She denies fevers, vomiting, diarrhea, leg pain, or leg swelling. She denies any known sick contacts or recent travel. The patient is not on any immunosuppressants or hormone replacement therapy. Lastly, she does not have a history of heart problems, lung problems, diabetes, or blood clots to her legs or lungs.    Allergies:  Sulfa Drugs     Medications:    The patient is currently on no regular medications.    Past Medical History:    Raynaud's disease  Obesity  Patellofemoral pain syndrome  Depression  Breast mass    Past Surgical History:    Breast lumpectomy, left  Tubal ligation  Diagnostic laparoscopy  FLY, and unilateral salpingo-oophorectomy  Tonsillectomy   D&C    Family History:    Ovarian cancer, alcoholism - mother  Diabetes, melanoma, alcoholism - father  Alcohol/drug - brother    Social History:  Negative for tobacco use.  Positive for alcohol use - 20 drinks/day.  Negative for drug use.  Marital Status:   [4]     Review of Systems   Constitutional: Positive for fatigue. Negative for fever.   HENT: Positive for ear pain (bilateral) and sore throat.    Respiratory: Positive for cough, chest tightness and shortness of breath.    Cardiovascular: Positive for chest pain. Negative for leg swelling.   Gastrointestinal: Negative for diarrhea and vomiting.   Musculoskeletal: Positive for myalgias.        Denies leg pain   Neurological: Positive for headaches.   All other systems reviewed and are negative.        Physical " Exam     Patient Vitals for the past 24 hrs:   BP Temp Pulse Heart Rate Resp SpO2 Weight   05/15/20 2100 (!) 140/87 -- 67 63 -- 98 % --   05/15/20 2030 (!) 146/99 -- 63 55 -- 100 % --   05/15/20 1942 (!) 179/106 97.8  F (36.6  C) 67 -- 16 99 % 90.7 kg (200 lb)     Physical Exam    Gen: alert  HEENT: PERRL, oropharynx clear  Lymph: cervical adenopathy, ears- bilateral middle ear effusions but no redness of the TMs  Neck: normal ROM  CV: RRR, no murmurs  Pulm: breath sounds equal, lungs clear  Abd: Soft, nontender  Back: no evidence of injury, no cva tenderness  MSK: no deformity, moves all extremities  Skin: no rash  Neuro: alert, appropriate conversation and interaction    Emergency Department Course     ECG (19:52:27):  Rate 57 bpm. OK interval 148. QRS duration 90. QT/QTc 444/432. P-R-T axes 30 36 34. Sinus bradycardia. Otherwise normal ECG. When compared with ECG of 07-AUG-2018 17:12, no significant change was found. Interpreted at 1958 by Hali Begum MD.    Imaging:  Radiology findings were communicated with the patient who voiced understanding of the findings.    XR  Chest Port 1 View:   Heart size and pulmonary vascularity normal. The lungs are clear. Thoracolumbar spinal curve, as per radiology.     Laboratory:  Laboratory findings were communicated with the patient who voiced understanding of the findings.    CBC: AWNL (WBC 7.6, HGB 15.7, )  BMP: AWNL (Creatinine 0.65)  Troponin I (2031): <0.015    Interventions:  2027: albuterol inhaler 6 puff Inhalation    Emergency Department Course:  Past medical records, nursing notes, and vitals reviewed.    2000: I performed an exam of the patient as documented above.     EKG obtained in the ED, see results above.     IV was inserted and blood was drawn for laboratory testing, results above.    The patient received a portable chest x-ray while in the emergency department, results above.     2106: I rechecked the patient and discussed the  results of her workup thus far. She felt the albuterol improved her chest tightness    I personally reviewed the laboratory, EKG, and imaging results with the Patient and answered all related questions prior to discharge.     Findings and plan explained to the Patient. Patient discharged home with instructions regarding supportive care, medications, and reasons to return. The importance of close follow-up was reviewed.     Impression & Plan     Medical Decision Making:  Roxie Funez is a 49 year old female who presents for cough, chest pain and shortness of breath.No evidence of PNA, PE (perc negative and low risk), ACS.  Discussed with her that this may be COVID-19 versus other viral syndrome.  No otitis media on exam.  COVID-19 pending (discussed precautions).  Testing obtained due toe elevated BP here and hx of elevated BPs in the past.  No evidence of respiratory failure- discussed precautions.          Diagnosis:    ICD-10-CM    1. Chest pain, unspecified type  R07.9    2. Shortness of breath  R06.02    3. Viral syndrome  B34.9    4. Suspected Covid-19 Virus Infection  Z20.828      Disposition:  Discharged to home.    Discharge Medications:  New Prescriptions    ALBUTEROL (PROAIR HFA) 108 (90 BASE) MCG/ACT INHALER    Inhale 2 puffs into the lungs every 4 hours as needed for shortness of breath / dyspnea    BENZONATATE (TESSALON) 200 MG CAPSULE    Take 1 capsule (200 mg) by mouth 3 times daily as needed for cough     Scribe Disclosure:  Francoise MOTTA, am serving as a scribe at 8:01 PM on 5/15/2020 to document services personally performed by Hali Begum MD based on my observations and the provider's statements to me.     Francoise Rodríguez  5/15/2020   [facility] EMERGENCY DEPARTMENT       Hali Begum MD  05/15/20 [id number]

## 2020-05-17 LAB — INTERPRETATION ECG - MUSE: NORMAL

## 2021-01-15 ENCOUNTER — HEALTH MAINTENANCE LETTER (OUTPATIENT)
Age: 51
End: 2021-01-15

## 2021-04-26 ENCOUNTER — OFFICE VISIT (OUTPATIENT)
Dept: URGENT CARE | Facility: URGENT CARE | Age: 51
End: 2021-04-26
Payer: COMMERCIAL

## 2021-04-26 VITALS
BODY MASS INDEX: 36.46 KG/M2 | WEIGHT: 209.1 LBS | TEMPERATURE: 98.6 F | OXYGEN SATURATION: 97 % | HEART RATE: 57 BPM | DIASTOLIC BLOOD PRESSURE: 80 MMHG | SYSTOLIC BLOOD PRESSURE: 132 MMHG

## 2021-04-26 DIAGNOSIS — R11.0 NAUSEA: ICD-10-CM

## 2021-04-26 DIAGNOSIS — R51.9 ACUTE NONINTRACTABLE HEADACHE, UNSPECIFIED HEADACHE TYPE: Primary | ICD-10-CM

## 2021-04-26 DIAGNOSIS — H92.01 OTALGIA, RIGHT: ICD-10-CM

## 2021-04-26 PROBLEM — J45.909 ASTHMA: Status: ACTIVE | Noted: 2021-04-26

## 2021-04-26 LAB
BASOPHILS # BLD AUTO: 0 10E9/L (ref 0–0.2)
BASOPHILS NFR BLD AUTO: 0.4 %
DIFFERENTIAL METHOD BLD: NORMAL
EOSINOPHIL # BLD AUTO: 0.2 10E9/L (ref 0–0.7)
EOSINOPHIL NFR BLD AUTO: 3.3 %
ERYTHROCYTE [DISTWIDTH] IN BLOOD BY AUTOMATED COUNT: 12.4 % (ref 10–15)
ERYTHROCYTE [SEDIMENTATION RATE] IN BLOOD BY WESTERGREN METHOD: 8 MM/H (ref 0–30)
HCT VFR BLD AUTO: 45 % (ref 35–47)
HGB BLD-MCNC: 15 G/DL (ref 11.7–15.7)
LYMPHOCYTES # BLD AUTO: 2.2 10E9/L (ref 0.8–5.3)
LYMPHOCYTES NFR BLD AUTO: 32.5 %
MCH RBC QN AUTO: 31.3 PG (ref 26.5–33)
MCHC RBC AUTO-ENTMCNC: 33.3 G/DL (ref 31.5–36.5)
MCV RBC AUTO: 94 FL (ref 78–100)
MONOCYTES # BLD AUTO: 0.5 10E9/L (ref 0–1.3)
MONOCYTES NFR BLD AUTO: 6.6 %
NEUTROPHILS # BLD AUTO: 3.9 10E9/L (ref 1.6–8.3)
NEUTROPHILS NFR BLD AUTO: 57.2 %
PLATELET # BLD AUTO: 292 10E9/L (ref 150–450)
RBC # BLD AUTO: 4.8 10E12/L (ref 3.8–5.2)
WBC # BLD AUTO: 6.9 10E9/L (ref 4–11)

## 2021-04-26 PROCEDURE — 99214 OFFICE O/P EST MOD 30 MIN: CPT | Mod: 25 | Performed by: FAMILY MEDICINE

## 2021-04-26 PROCEDURE — 36415 COLL VENOUS BLD VENIPUNCTURE: CPT | Performed by: FAMILY MEDICINE

## 2021-04-26 PROCEDURE — 85652 RBC SED RATE AUTOMATED: CPT | Performed by: FAMILY MEDICINE

## 2021-04-26 PROCEDURE — 96372 THER/PROPH/DIAG INJ SC/IM: CPT | Performed by: FAMILY MEDICINE

## 2021-04-26 PROCEDURE — 85025 COMPLETE CBC W/AUTO DIFF WBC: CPT | Performed by: FAMILY MEDICINE

## 2021-04-26 PROCEDURE — 87635 SARS-COV-2 COVID-19 AMP PRB: CPT | Performed by: PHYSICIAN ASSISTANT

## 2021-04-26 RX ORDER — KETOROLAC TROMETHAMINE 30 MG/ML
30 INJECTION, SOLUTION INTRAMUSCULAR; INTRAVENOUS ONCE
Status: COMPLETED | OUTPATIENT
Start: 2021-04-26 | End: 2021-04-26

## 2021-04-26 RX ORDER — ONDANSETRON 4 MG/1
4 TABLET, FILM COATED ORAL EVERY 8 HOURS PRN
Qty: 8 TABLET | Refills: 0 | Status: SHIPPED | OUTPATIENT
Start: 2021-04-26 | End: 2023-01-01

## 2021-04-26 RX ORDER — SUMATRIPTAN 50 MG/1
50 TABLET, FILM COATED ORAL
Qty: 15 TABLET | Refills: 0 | Status: SHIPPED | OUTPATIENT
Start: 2021-04-26 | End: 2023-01-01

## 2021-04-26 RX ORDER — IBUPROFEN 200 MG
200 TABLET ORAL EVERY 4 HOURS PRN
COMMUNITY
End: 2023-01-01

## 2021-04-26 RX ADMIN — KETOROLAC TROMETHAMINE 30 MG: 30 INJECTION, SOLUTION INTRAMUSCULAR; INTRAVENOUS at 18:00

## 2021-04-26 ASSESSMENT — ENCOUNTER SYMPTOMS
FEVER: 0
NAUSEA: 1

## 2021-04-26 NOTE — PATIENT INSTRUCTIONS
Patient Education     Tension Headache     A muscle tension headache is a very common cause of head pain. It s also called a stress headache. When some people are under stress, they tense the muscles of their shoulder, neck, and scalp without knowing it. If this tension lasts long enough, a headache can occur. A tension headache can be quite painful. It can last for hours or even days.  Home care  Follow these tips when caring for yourself at home:    Don t drive yourself home if you were given pain medicine for your headache. Instead, have someone else drive you home. Try to sleep when you get home. You should feel much better when you wake up.    Put heat on the back of your neck to help ease neck spasm.  How to prevent tension-type headaches    Figure out what is causing stress in your life. Learn new ways to handle your stress. Ideas include regular exercise, biofeedback, self-hypnosis, yoga, and meditation. Talk with your healthcare provider to find out more information about managing stress. Many books and digital media are also available on this subject.    Take time out at the first sign of a tension headache, if possible. Take yourself out of the stressful situation. Find a quiet, comfortable place to sit or lie down and let yourself relax. Heat and deep massage of the tight areas in the neck and shoulders may help ease muscle spasm. You may also get relief from a medicine such as acetaminophen, ibuprofen, naproxen, or a prescribed muscle relaxant.    Follow-up care  Follow up with your healthcare provider, or as advised. Talk with your provider if you have frequent headaches. He or she can figure out a treatment plan. Ask if you can have medicine to take at home the next time you get a bad headache. This may keep you from having to visit the emergency department in the future. You may need to see a headache specialist (neurologist) if you continue to have headaches.  When to seek medical advice  Call your  healthcare provider right away if any of these occur:    Your head pain gets worse during sexual intercourse or strenuous activity    Your head pain doesn t get better within 24 hours    You aren t able to keep liquids down (repeated vomiting)    Fever of 100.4 F (38 C) or higher, or as directed by your healthcare provider    Stiff neck    Extreme drowsiness, confusion, or fainting    Dizziness or dizziness with spinning sensation (vertigo)    Weakness in an arm or leg or one side of your face    You have trouble speaking    Your vision changes  Alvaro last reviewed this educational content on 10/1/2019    5505-3529 [employer]. All rights reserved. This information is not intended as a substitute for professional medical care. Always follow your healthcare professional's instructions.

## 2021-04-26 NOTE — PROGRESS NOTES
Assessment & Plan     Acute nonintractable headache, unspecified headache type  Acute problem.  Toradol was given in clinic with some improvement of the headache however continues to have nausea.  A CBC was evaluated without evidence of leukocytosis making infectious etiologies less likely.  ESR negative, unlikely giant cell arteritis.  She is going to trial Imitrex for suspected migraine, follow-up with PCP or go to the emergency room if headache suddenly worsened.  - Symptomatic COVID-19 Virus (Coronavirus) by PCR  - ketorolac (TORADOL) injection 30 mg  - CBC with platelets and differential  - ESR: Erythrocyte sedimentation rate  - SUMAtriptan (IMITREX) 50 MG tablet  Dispense: 15 tablet; Refill: 0    Otalgia, right  Possibly related to prior #1.  There is no evidence of otitis media, externa or mastoiditis.    Nausea  Start Zofran  - ondansetron (ZOFRAN) 4 MG tablet  Dispense: 8 tablet; Refill: 0      Return in about 3 days (around 4/29/2021) for If symptoms do not improve or gets worse..    Juan Talbot MD  [facility] Faustin is a 50 year old who presents for the following health issues     HPI     5 day history of right ear pain, headache, nausea. Nausea was improving, then bilateral ear ache and fatigue.  Gets improvement after laying down in the dark room that is very quiet.  No known history of migraines.  Symptoms refractory to Tylenol, ibuprofen and Aleve.  Has been under more stress lately.    Review of Systems   Constitutional: Negative for fever.   Gastrointestinal: Positive for nausea.            Objective    /80   Pulse 57   Temp 98.6  F (37  C)   Wt 94.8 kg (209 lb 1.6 oz)   SpO2 97%   BMI 36.46 kg/m    Body mass index is 36.46 kg/m .  Physical Exam  Vitals signs reviewed.   Constitutional:       Appearance: Normal appearance.   HENT:      Right Ear: Tympanic membrane normal.      Left Ear: Tympanic membrane normal.      Ears:      Comments:  Bilateral mastoids are nonerythematous, no edema, nontender.  No pre or postauricular adenopathy     Mouth/Throat:      Mouth: Mucous membranes are moist.      Pharynx: Oropharynx is clear.   Eyes:      Pupils: Pupils are equal, round, and reactive to light.   Neck:      Musculoskeletal: Normal range of motion. No neck rigidity.   Cardiovascular:      Rate and Rhythm: Normal rate and regular rhythm.   Pulmonary:      Effort: Pulmonary effort is normal.      Breath sounds: Normal breath sounds.   Lymphadenopathy:      Cervical: No cervical adenopathy.   Neurological:      Mental Status: She is alert.            Results for orders placed or performed in visit on 04/26/21 (from the past 24 hour(s))   CBC with platelets and differential   Result Value Ref Range    WBC 6.9 4.0 - 11.0 10e9/L    RBC Count 4.80 3.8 - 5.2 10e12/L    Hemoglobin 15.0 11.7 - 15.7 g/dL    Hematocrit 45.0 35.0 - 47.0 %    MCV 94 78 - 100 fl    MCH 31.3 26.5 - 33.0 pg    MCHC 33.3 31.5 - 36.5 g/dL    RDW 12.4 10.0 - 15.0 %    Platelet Count 292 150 - 450 10e9/L    Diff Method Automated Method     % Neutrophils 57.2 %    % Lymphocytes 32.5 %    % Monocytes 6.6 %    % Eosinophils 3.3 %    % Basophils 0.4 %    Absolute Neutrophil 3.9 1.6 - 8.3 10e9/L    Absolute Lymphocytes 2.2 0.8 - 5.3 10e9/L    Absolute Monocytes 0.5 0.0 - 1.3 10e9/L    Absolute Eosinophils 0.2 0.0 - 0.7 10e9/L    Absolute Basophils 0.0 0.0 - 0.2 10e9/L   ESR: Erythrocyte sedimentation rate   Result Value Ref Range    Sed Rate 8 0 - 30 mm/h

## 2021-04-27 LAB
SARS-COV-2 RNA RESP QL NAA+PROBE: NORMAL
SPECIMEN SOURCE: NORMAL

## 2021-04-28 DIAGNOSIS — Z12.31 VISIT FOR SCREENING MAMMOGRAM: ICD-10-CM

## 2021-04-28 LAB
LABORATORY COMMENT REPORT: NORMAL
SARS-COV-2 RNA RESP QL NAA+PROBE: NEGATIVE
SPECIMEN SOURCE: NORMAL

## 2021-04-28 PROCEDURE — 77067 SCR MAMMO BI INCL CAD: CPT | Mod: TC | Performed by: RADIOLOGY

## 2021-05-16 DIAGNOSIS — R51.9 ACUTE NONINTRACTABLE HEADACHE, UNSPECIFIED HEADACHE TYPE: ICD-10-CM

## 2021-05-17 ENCOUNTER — MYC MEDICAL ADVICE (OUTPATIENT)
Dept: FAMILY MEDICINE | Facility: CLINIC | Age: 51
End: 2021-05-17

## 2021-05-17 RX ORDER — SUMATRIPTAN 50 MG/1
50 TABLET, FILM COATED ORAL
Qty: 15 TABLET | Refills: 0 | OUTPATIENT
Start: 2021-05-17

## 2021-05-19 DIAGNOSIS — R51.9 ACUTE NONINTRACTABLE HEADACHE, UNSPECIFIED HEADACHE TYPE: ICD-10-CM

## 2021-05-19 RX ORDER — SUMATRIPTAN 50 MG/1
50 TABLET, FILM COATED ORAL
Qty: 15 TABLET | Refills: 0 | OUTPATIENT
Start: 2021-05-19

## 2021-05-30 DIAGNOSIS — Z11.59 ENCOUNTER FOR SCREENING FOR OTHER VIRAL DISEASES: Primary | ICD-10-CM

## 2021-06-19 DIAGNOSIS — Z11.59 ENCOUNTER FOR SCREENING FOR OTHER VIRAL DISEASES: ICD-10-CM

## 2021-06-19 LAB
LABORATORY COMMENT REPORT: NORMAL
SARS-COV-2 RNA RESP QL NAA+PROBE: NEGATIVE
SARS-COV-2 RNA RESP QL NAA+PROBE: NORMAL
SPECIMEN SOURCE: NORMAL
SPECIMEN SOURCE: NORMAL

## 2021-06-19 PROCEDURE — U0005 INFEC AGEN DETEC AMPLI PROBE: HCPCS | Performed by: INTERNAL MEDICINE

## 2021-06-19 PROCEDURE — U0003 INFECTIOUS AGENT DETECTION BY NUCLEIC ACID (DNA OR RNA); SEVERE ACUTE RESPIRATORY SYNDROME CORONAVIRUS 2 (SARS-COV-2) (CORONAVIRUS DISEASE [COVID-19]), AMPLIFIED PROBE TECHNIQUE, MAKING USE OF HIGH THROUGHPUT TECHNOLOGIES AS DESCRIBED BY CMS-2020-01-R: HCPCS | Performed by: INTERNAL MEDICINE

## 2021-06-22 ENCOUNTER — HOSPITAL ENCOUNTER (OUTPATIENT)
Facility: CLINIC | Age: 51
Discharge: HOME | End: 2021-06-22
Attending: INTERNAL MEDICINE | Admitting: INTERNAL MEDICINE
Payer: COMMERCIAL

## 2021-06-22 VITALS
HEIGHT: 65 IN | BODY MASS INDEX: 33.32 KG/M2 | TEMPERATURE: 97.5 F | HEART RATE: 48 BPM | RESPIRATION RATE: 16 BRPM | WEIGHT: 200 LBS | SYSTOLIC BLOOD PRESSURE: 116 MMHG | DIASTOLIC BLOOD PRESSURE: 68 MMHG | OXYGEN SATURATION: 99 %

## 2021-06-22 LAB — COLONOSCOPY: NORMAL

## 2021-06-22 PROCEDURE — G0121 COLON CA SCRN NOT HI RSK IND: HCPCS | Performed by: INTERNAL MEDICINE

## 2021-06-22 PROCEDURE — 250N000011 HC RX IP 250 OP 636: Performed by: INTERNAL MEDICINE

## 2021-06-22 PROCEDURE — 45378 DIAGNOSTIC COLONOSCOPY: CPT | Performed by: INTERNAL MEDICINE

## 2021-06-22 PROCEDURE — G0500 MOD SEDAT ENDO SERVICE >5YRS: HCPCS | Performed by: INTERNAL MEDICINE

## 2021-06-22 PROCEDURE — 258N000003 HC RX IP 258 OP 636: Performed by: INTERNAL MEDICINE

## 2021-06-22 RX ORDER — FLUMAZENIL 0.1 MG/ML
0.2 INJECTION, SOLUTION INTRAVENOUS
Status: DISCONTINUED | OUTPATIENT
Start: 2021-06-22 | End: 2021-06-22 | Stop reason: HOSPADM

## 2021-06-22 RX ORDER — NALOXONE HYDROCHLORIDE 0.4 MG/ML
0.4 INJECTION, SOLUTION INTRAMUSCULAR; INTRAVENOUS; SUBCUTANEOUS
Status: DISCONTINUED | OUTPATIENT
Start: 2021-06-22 | End: 2021-06-22 | Stop reason: HOSPADM

## 2021-06-22 RX ORDER — ONDANSETRON 4 MG/1
4 TABLET, ORALLY DISINTEGRATING ORAL EVERY 6 HOURS PRN
Status: DISCONTINUED | OUTPATIENT
Start: 2021-06-22 | End: 2021-06-22 | Stop reason: HOSPADM

## 2021-06-22 RX ORDER — PROCHLORPERAZINE MALEATE 10 MG
10 TABLET ORAL EVERY 6 HOURS PRN
Status: DISCONTINUED | OUTPATIENT
Start: 2021-06-22 | End: 2021-06-22 | Stop reason: HOSPADM

## 2021-06-22 RX ORDER — ONDANSETRON 2 MG/ML
4 INJECTION INTRAMUSCULAR; INTRAVENOUS EVERY 6 HOURS PRN
Status: DISCONTINUED | OUTPATIENT
Start: 2021-06-22 | End: 2021-06-22 | Stop reason: HOSPADM

## 2021-06-22 RX ORDER — SODIUM CHLORIDE 9 MG/ML
INJECTION, SOLUTION INTRAVENOUS CONTINUOUS PRN
Status: COMPLETED | OUTPATIENT
Start: 2021-06-22 | End: 2021-06-22

## 2021-06-22 RX ORDER — NALOXONE HYDROCHLORIDE 0.4 MG/ML
0.2 INJECTION, SOLUTION INTRAMUSCULAR; INTRAVENOUS; SUBCUTANEOUS
Status: DISCONTINUED | OUTPATIENT
Start: 2021-06-22 | End: 2021-06-22 | Stop reason: HOSPADM

## 2021-06-22 RX ORDER — FENTANYL CITRATE 50 UG/ML
INJECTION, SOLUTION INTRAMUSCULAR; INTRAVENOUS PRN
Status: COMPLETED | OUTPATIENT
Start: 2021-06-22 | End: 2021-06-22

## 2021-06-22 RX ADMIN — FENTANYL CITRATE 100 MCG: 50 INJECTION, SOLUTION INTRAMUSCULAR; INTRAVENOUS at 08:52

## 2021-06-22 RX ADMIN — MIDAZOLAM 1 MG: 1 INJECTION INTRAMUSCULAR; INTRAVENOUS at 09:00

## 2021-06-22 RX ADMIN — SODIUM CHLORIDE 125 ML/HR: 9 INJECTION, SOLUTION INTRAVENOUS at 08:55

## 2021-06-22 RX ADMIN — MIDAZOLAM 2 MG: 1 INJECTION INTRAMUSCULAR; INTRAVENOUS at 08:52

## 2021-06-22 RX ADMIN — ONDANSETRON 4 MG: 4 TABLET, ORALLY DISINTEGRATING ORAL at 09:55

## 2021-06-22 RX ADMIN — FENTANYL CITRATE 50 MCG: 50 INJECTION, SOLUTION INTRAMUSCULAR; INTRAVENOUS at 09:00

## 2021-06-22 ASSESSMENT — MIFFLIN-ST. JEOR: SCORE: 1528.07

## 2021-06-22 NOTE — LETTER
Sunshine 3, 2021      Roxie Funez  [address] [facility] [address]        Dear Roxie,     Please be aware that coverage of these services is subject to the terms and limitations of your health insurance plan.  Call member services at your health plan with any benefit or coverage questions.    Thank you for choosing [facility]. You are scheduled for the following service(s):    Date:  6/22/2021             Procedure:  COLONOSCOPY  Doctor:        Dr. Jevon Cody   Arrival Time:  8:30  *Enter and check in at the Main Hospital Entrance*  Procedure Time:  9:00      Location:   [facility]        Endoscopy Department, First Floor         [address] [place], [place] [address] [phone] or [phone] (Counts include 234 beds at the [facility]) to reschedule          MIRALAX -GATORADE  PREP  Colonoscopy is the most accurate test to detect colon polyps and colon cancer; and the only test where polyps can be removed. During this procedure, a doctor examines the lining of your large intestine and rectum through a flexible tube.   Transportation  You must arrange for a ride for the day of your procedure with a responsible adult. A taxi , Uber, etc, is not an option unless you are accompanied by a responsible adult. If you fail to arrange transportation with a responsible adult, your procedure will be cancelled and rescheduled.    Purchase the  following supplies at your local pharmacy:  - 2 (two) bisacodyl tablets: each tablet contains 5 mg.  (Dulcolax  laxative NOT Dulcolax  stool softener)   - 1 (one) 8.3 oz bottle of Polyethylene Glycol (PEG) 3350 Powder   (MiraLAX , Smooth LAX , ClearLAX  or equivalent)  - 64 oz Gatorade    Regular Gatorade, Gatorade G2 , Powerade , Powerade Zero  or Pedialyte  is acceptable. Red colored flavors are not allowed; all other colors (yellow, green, orange, purple and blue) are okay. It is also okay to buy two 2.12 oz packets of powdered Gatorade  that can be mixed with water to a total volume of 64 oz of liquid.  - 1 (one) 10 oz bottle of Magnesium Citrate (Red colored flavors are not allowed)  It is also okay for you to use a 0.5 oz package of powdered magnesium citrate (17 g) mixed with 10 oz of water.      PREPARATION FOR COLONOSCOPY    7 days before:    Discontinue fiber supplements and medications containing iron. This includes Metamucil  and Fibercon ; and multivitamins with iron.    3 days before:    Begin a low-fiber diet. A low-fiber diet helps making the cleanout more effective.     Examples of a low-fiber diet include (but are not limited to): white bread, white rice, pasta, crackers, fish, chicken, eggs, ground beef, creamy peanut butter, cooked/steamed/boiled vegetables, canned fruit, bananas, melons, milk, plain yogurt cheese, salad dressing and other condiments.     The following are not allowed on a low-fiber diet: seeds, nuts, popcorn, bran, whole wheat, corn, quinoa, raw fruits and vegetables, berries and dried fruit, beans and lentils.    For additional details on low-fiber diet, please refer to the table on the last page.    2 days before:    Continue the low-fiber diet.     Drink at least 8 glasses of water throughout the day.     Stop eating solid foods at 11:45 pm.    1 day before:    In the morning: begin a clear liquid diet (liquids you can see through).     Examples of a clear liquid diet include: water, clear broth or bouillon, Gatorade, Pedialyte or Powerade, carbonated and non-carbonated soft drinks (Sprite , 7-Up , ginger ale), strained fruit juices without pulp (apple, white grape, white cranberry), Jell-O  and popsicles.     The following are not allowed on a clear liquid diet: red liquids, alcoholic beverages, dairy products (milk, creamer, and yogurt), protein shakes, creamy broths, juice with pulp and chewing tobacco.    At noon: take 2 (two) bisacodyl tablets     At 4 (and no later than 6pm): start drinking the  Miralax-Gatorade preparation (8.3 oz of Miralax mixed with 64 oz of Gatorade in a large pitcher). Drink 1(one) 8 oz glass every 15 minutes thereafter, until the mixture is gone.    COLON CLEANSING TIPS: drink adequate amounts of fluids before and after your colon cleansing to prevent dehydration. Stay near a toilet because you will have diarrhea. Even if you are sitting on the toilet, continue to drink the cleansing solution every 15 minutes. If you feel nauseous or vomit, rinse your mouth with water, take a 15 to 30-minute-break and then continue drinking the solution. You will be uncomfortable until the stool has flushed from your colon (in about 2 to 4 hours). You may feel chilled.    Day of your procedure  You may take all of your morning medications including blood pressure medications, blood thinners (if you have not been instructed to stop these by our office), methadone, anti-seizure medications with sips of water 3 hours prior to your procedure or earlier. Do not take insulin or vitamins prior to your procedure. Continue the clear liquid diet.       4 hours prior: drink 10 oz of magnesium citrate. It may be easier to drink it with a straw.    STOP consuming all liquids after that.     Do not take anything by mouth during this time.     Allow extra time to travel to your procedure as you may need to stop and use a restroom along the way.    You are ready for the procedure, if you followed all instructions and your stool is no longer formed, but clear or yellow liquid. If you are unsure whether your colon is clean, please call our office at [phone] before you leave for your appointment.    Bring the following to your procedure:  - Insurance Card/Photo ID.   - List of current medications including over-the-counter medications and supplements.   - Your rescue inhaler if you currently use one to control asthma.    Canceling or rescheduling your appointment:   If you must cancel or reschedule your  appointment, please call [phone] as soon as possible.      COLONOSCOPY PRE-PROCEDURE CHECKLIST    If you have diabetes, ask your regular doctor for diet and medication restrictions.  If you take an anticoagulant or anti-platelet medication (such as Coumadin , Lovenox , Pradaxa , Xarelto , Eliquis , etc.), please call your primary doctor for advice on holding this medication.  If you take aspirin you may continue to do so.  If you are or may be pregnant, please discuss the risks and benefits of this procedure with your doctor.        What happens during a colonoscopy?    Plan to spend up to two hours, starting at registration time, at the endoscopy center the day of your procedure. The colonoscopy takes an average of 15 to 30 minutes. Recovery time is about 30 minutes.      Before the exam:    You will change into a gown.    Your medical history and medication list will be reviewed with you, unless that has been done over the phone prior to the procedure.     A nurse will insert an intravenous (IV) line into your hand or arm.    The doctor will meet with you and will give you a consent form to sign.  During the exam:     Medicine will be given through the IV line to help you relax.     Your heart rate and oxygen levels will be monitored. If your blood pressure is low, you may be given fluids through the IV line.     The doctor will insert a flexible hollow tube, called a colonoscope, into your rectum. The scope will be advanced slowly through the large intestine (colon).    You may have a feeling of fullness or pressure.     If an abnormal tissue or a polyp is found, the doctor may remove it through the endoscope for closer examination, or biopsy. Tissue removal is painless    After the exam:           Any tissue samples removed during the exam will be sent to a lab for evaluation. It may take 5-7 working days for you to be notified of the results.     A nurse will provide you with complete discharge  instructions before you leave the endoscopy center. Be sure to ask the nurse for specific instructions if you take blood thinners such as Aspirin, Coumadin or Plavix.     The doctor will prepare a full report for you and for the physician who referred you for the procedure.     Your doctor will talk with you about the initial results of your exam.      Medication given during the exam will prohibit you from driving for the rest of the day.     Following the exam, you may resume your normal diet. Your first meal should be light, no greasy foods. Avoid alcohol until the next day.     You may resume your regular activities the day after the procedure.         LOW-FIBER DIET    Foods RECOMMENDED Foods to AVOID   Breads, Cereal, Rice and Pasta:   White bread, rolls, biscuits, croissant and jo ann toast.   Waffles, Maltese toast and pancakes.   White rice, noodles, pasta, macaroni and peeled cooked potatoes.   Plain crackers and saltines.   Cooked cereals: farina, cream of rice.   Cold cereals: Puffed Rice , Rice Krispies , Corn Flakes  and Special K    Breads, Cereal, Rice and Pasta:   Breads or rolls with nuts, seeds or fruit.   Whole wheat, pumpernickel, rye breads and cornbread.   Potatoes with skin, brown or wild rice, and kasha (buckwheat).     Vegetables:   Tender cooked and canned vegetables without seeds: carrots, asparagus tips, green or wax beans, pumpkin, spinach, lima beans. Vegetables:   Raw or steamed vegetables.   Vegetables with seeds.   Sauerkraut.   Winter squash, peas, broccoli, Brussel sprouts, cabbage, onions, cauliflower, baked beans, peas and corn.   Fruits:   Strained fruit juice.   Canned fruit, except pineapple.   Ripe bananas and melon. Fruits:   Prunes and prune juice.   Raw fruits.   Dried fruits: figs, dates and raisins.   Milk/Dairy:   Milk: plain or flavored.   Yogurt, custard and ice cream.   Cheese and cottage cheese Milk/Dairy:     Meat and other proteins:   ground, well-cooked tender  beef, lamb, ham, veal, pork, fish, poultry and organ meats.   Eggs.   Peanut butter without nuts. Meat and other proteins:   Tough, fibrous meats with gristle.   Dry beans, peas and lentils.   Peanut butter with nuts.   Tofu.   Fats, Snack, Sweets, Condiments and Beverages:   Margarine, butter, oils, mayonnaise, sour cream and salad dressing, plain gravy.   Sugar, hard candy, clear jelly, honey and syrup.   Spices, cooked herbs, bouillon, broth and soups made with allowed vegetable, ketchup and mustard.   Coffee, tea and carbonated drinks.   Plain cakes, cookies and pretzels.   Gelatin, plain puddings, custard, ice cream, sherbet and popsicles. Fats, Snack, Sweets, Condiments and Beverages:   Nuts, seeds and coconut.   Jam, marmalade and preserves.   Pickles, olives, relish and horseradish.   All desserts containing nuts, seeds, dried fruit and coconut; or made from whole grains or bran.   Candy made with nuts or seeds.   Popcorn.         DIRECTIONS TO THE ENDOSCOPY DEPARTMENT    From the north ([facility])  Take [address] South, exit on [address] [address]. Get into the left hand ari, turn left (east), go one-half mile to [address] and turn left. Go north to the second stoplight, take a right on [address] and follow it to the Main Hospital entrance.    From the south ([facility])  Take 35N to the [address]E split and exit on [address] [address]. On [address] [address], turn left (west) to [address]. Turn right (north) on [address]. Go north to the second stoplight, take a right on [address] and follow it to the Main Hospital entrance.    From the east via 35E ([facility])  Take [address] south to [address] [address] exit. Turn right on [address] [address]. Go west to [address]. Turn right (north) on [address]. Go to the second stoplight, take a right on [address] to the Main Hospital entrance.    From the east via [address] ([facility])  Take [facility] [address]  West to [address]. Turn left (south) on [address] to [address], turn left (east) on [address] and follow it to the Main Hospital entrance.    From the west via [address] ([place], [place])  Take [address] east to [address]. Turn right (south) on [address] to [address], turn left (east) on [address] and follow it to the Main Hospital entrance.

## 2021-06-22 NOTE — H&P
Pre-Endoscopy History and Physical     Roxie Funez MRN# [id number]   YOB: 1970 Age: 50 year old     Date of Procedure: 6/22/2021  Primary care provider: Zulay Rueda  Type of Endoscopy: Colonoscopy with possible biopsy, possible polypectomy  Reason for Procedure: screen  Type of Anesthesia Anticipated: Conscious Sedation    HPI:    Roxie is a 50 year old female who will be undergoing the above procedure.      A history and physical has been performed. The patient's medications and allergies have been reviewed. The risks and benefits of the procedure and the sedation options and risks were discussed with the patient.  All questions were answered and informed consent was obtained.      She denies a personal or family history of anesthesia complications or bleeding disorders.     Patient Active Problem List   Diagnosis     Major depressive disorder, single episode, mild (H)     Patellofemoral pain syndrome     Low back pain     Obesity     Raynaud's disease     Asthma        Past Medical History:   Diagnosis Date     Lump or mass in breast     eval through [facility] rads, with bx in 2/05.  Left upper pole mass.     Major depressive disorder, single episode, mild (H)     well-managed at this time off medications        Past Surgical History:   Procedure Laterality Date     BREAST LUMPECTOMY, RT/LT  2005    left     C LIGATE FALLOPIAN TUBE,POSTPARTUM  1997    Tubal Ligation     C TOTAL ABDOM HYSTERECTOMY  5/2000    Hysterectomy, Total Abdominal     HC DILATION/CURETTAGE DIAG/THER NON OB  2000    D & C     HC REMOVAL OF OVARY/TUBE(S)  5/2000    Salpingo-Oophorectomy, Unilateral       HC REMOVAL OF TONSILS,12+ Y/O       ZZC NONSPECIFIC PROCEDURE  1999    Diagnostic Laparoscopy       Social History     Tobacco Use     Smoking status: Never Smoker     Smokeless tobacco: Never Used   Substance Use Topics     Alcohol use: Yes     Alcohol/week: 20.0 standard drinks     Comment: is cautious  "about intake due to family history       Family History   Problem Relation Age of Onset     Cancer Mother         ovarian at age 30, no other family hx of ovarian or breast ca     Diabetes Father      Skin Cancer Father      Melanoma Father      Skin Cancer Paternal Grandmother      Melanoma Paternal Grandmother      Diabetes Paternal Grandfather      Alcohol/Drug Brother         both parents also have Etoh issues     Breast Cancer No family hx of      Cancer - colorectal No family hx of      C.A.D. No family hx of      Eye Disorder No family hx of         no glaucoma     Colon Cancer No family hx of        Prior to Admission medications    Medication Sig Start Date End Date Taking? Authorizing Provider   Acetaminophen (TYLENOL PO)     Reported, Patient   albuterol (PROAIR HFA) 108 (90 Base) MCG/ACT inhaler Inhale 2 puffs into the lungs every 4 hours as needed for shortness of breath / dyspnea  Patient not taking: Reported on 4/26/2021 5/15/20   Hali Begum MD   benzonatate (TESSALON) 200 MG capsule Take 1 capsule (200 mg) by mouth 3 times daily as needed for cough  Patient not taking: Reported on 4/26/2021 5/15/20   Hali Begum MD   ibuprofen (ADVIL/MOTRIN) 200 MG tablet Take 200 mg by mouth every 4 hours as needed for mild pain    Reported, Patient   ondansetron (ZOFRAN) 4 MG tablet Take 1 tablet (4 mg) by mouth every 8 hours as needed for nausea 4/26/21   Juan Talbot MD   SUMAtriptan (IMITREX) 50 MG tablet Take 1 tablet (50 mg) by mouth at onset of headache for migraine May repeat in 2 hours. Max 4 tablets/24 hours. 4/26/21   Juan Talbot MD       Allergies   Allergen Reactions     Sulfa Drugs         REVIEW OF SYSTEMS:   5 point ROS negative except as noted above in HPI, including Gen., Resp., CV, GI &  system review.    PHYSICAL EXAM:   /69   Pulse 55   Temp 97.5  F (36.4  C) (Temporal)   Resp 20   Ht 1.651 m (5' 5\")   Wt 90.7 kg (200 lb)   SpO2 99%   BMI " "33.28 kg/m   Estimated body mass index is 33.28 kg/m  as calculated from the following:    Height as of this encounter: 1.651 m (5' 5\").    Weight as of this encounter: 90.7 kg (200 lb).   GENERAL APPEARANCE: alert, and oriented  MENTAL STATUS: alert  AIRWAY EXAM: Mallampatti Class I (visualization of the soft palate, fauces, uvula, anterior and posterior pillars)  RESP: lungs clear to auscultation - no rales, rhonchi or wheezes  CV: regular rates and rhythm  DIAGNOSTICS:    Not indicated    IMPRESSION   ASA Class 2 - Mild systemic disease    PLAN:   Plan for Colonoscopy with possible biopsy, possible polypectomy. We discussed the risks, benefits and alternatives and the patient wished to proceed.    The above has been forwarded to the consulting provider.      Signed Electronically by: Jevon Cody MD  June 22, 2021          "

## 2021-09-04 ENCOUNTER — HEALTH MAINTENANCE LETTER (OUTPATIENT)
Age: 51
End: 2021-09-04

## 2022-02-19 ENCOUNTER — HEALTH MAINTENANCE LETTER (OUTPATIENT)
Age: 52
End: 2022-02-19

## 2022-08-06 ENCOUNTER — HEALTH MAINTENANCE LETTER (OUTPATIENT)
Age: 52
End: 2022-08-06

## 2022-10-03 ENCOUNTER — OFFICE VISIT (OUTPATIENT)
Dept: URGENT CARE | Facility: URGENT CARE | Age: 52
End: 2022-10-03
Payer: COMMERCIAL

## 2022-10-03 VITALS
HEART RATE: 61 BPM | TEMPERATURE: 97 F | SYSTOLIC BLOOD PRESSURE: 140 MMHG | OXYGEN SATURATION: 99 % | DIASTOLIC BLOOD PRESSURE: 76 MMHG

## 2022-10-03 DIAGNOSIS — H65.91 OME (OTITIS MEDIA WITH EFFUSION), RIGHT: Primary | ICD-10-CM

## 2022-10-03 PROCEDURE — 99213 OFFICE O/P EST LOW 20 MIN: CPT | Performed by: PHYSICIAN ASSISTANT

## 2022-10-03 RX ORDER — CEFDINIR 300 MG/1
300 CAPSULE ORAL 2 TIMES DAILY
Qty: 20 CAPSULE | Refills: 0 | Status: SHIPPED | OUTPATIENT
Start: 2022-10-03 | End: 2022-10-13

## 2022-10-03 NOTE — PROGRESS NOTES
SUBJECTIVE:  Roxie Funez is a 51 year old female who comes in with a 4-day history of right ear pain.  Patient states that it started as a dull pain but has been increasing for the past 4 days and now has stabbing pain.  She has constant pressure in her ear.  She does not have significant ear history.  No recent trauma to the ear noted.  She denies any recent URI related symptoms and no recent flying.  Has been taking large amounts of ibuprofen for pain.  Denies any drainage from the ear.  States that she felt that her ear was slightly swollen behind it.  She is otherwise at baseline health        Past Medical History:   Diagnosis Date     Lump or mass in breast     eval through [facility], with bx in 2/05.  Left upper pole mass.     Major depressive disorder, single episode, mild (H)     well-managed at this time off medications     Current Outpatient Medications   Medication     Acetaminophen (TYLENOL PO)     ibuprofen (ADVIL/MOTRIN) 200 MG tablet     ondansetron (ZOFRAN) 4 MG tablet     SUMAtriptan (IMITREX) 50 MG tablet     No current facility-administered medications for this visit.     Social History     Socioeconomic History     Marital status:      Spouse name: Not on file     Number of children: Not on file     Years of education: Not on file     Highest education level: Not on file   Occupational History     Occupation:      Employer: [employer]   Tobacco Use     Smoking status: Never Smoker     Smokeless tobacco: Never Used   Substance and Sexual Activity     Alcohol use: Yes     Alcohol/week: 20.0 standard drinks     Comment: is cautious about intake due to family history     Drug use: No     Sexual activity: Not Currently     Partners: Male     Birth control/protection: Surgical   Other Topics Concern      Service No     Blood Transfusions No     Caffeine Concern Not Asked     Occupational Exposure Not Asked     Hobby Hazards Not Asked     Sleep Concern Not  Asked     Stress Concern Not Asked     Weight Concern Not Asked     Special Diet Not Asked     Back Care Not Asked     Exercise No     Comment: not currently     Bike Helmet Not Asked     Seat Belt Yes     Self-Exams Not Asked     Parent/sibling w/ CABG, MI or angioplasty before 65F 55M? No   Social History Narrative     Not on file     Social Determinants of Health     Financial Resource Strain: Not on file   Food Insecurity: Not on file   Transportation Needs: Not on file   Physical Activity: Not on file   Stress: Not on file   Social Connections: Not on file   Intimate Partner Violence: Not on file   Housing Stability: Not on file     ROS  Negative other than stated above    Exam:  GENERAL APPEARANCE: healthy, alert and no distress  EYES: EOMI,  PERRL  HENT: Left TM and canal clear.  Right TM erythematous with distorted light reflex.  There is no perforation noted.  Canal is clear.  Mastoid is nontender.  Oral mucosa moist with no erythema or exudate.  No nasal congestion present  NECK: no adenopathy, no asymmetry, masses, or scars and thyroid normal to palpation  RESP: lungs clear to auscultation - no rales, rhonchi or wheezes  CV: regular rates and rhythm, normal S1 S2, no S3 or S4 and no murmur, click or rub -  SKIN: no suspicious lesions or rashes    assessment/plan:  (H65.91) OME (otitis media with effusion), right  (primary encounter diagnosis)  Comment:   Plan: cefdinir (OMNICEF) 300 MG capsule        Patient with a 4-day history of right ear pain worsening.  Exam does reveal otitis media.  Omnicef as directed.  Advised to continue with over-the-counter medication as needed for pain.  Warm compress to ear for comfort.  Signs of perforation discussed we will follow-up with primary as needed

## 2022-10-16 ENCOUNTER — HEALTH MAINTENANCE LETTER (OUTPATIENT)
Age: 52
End: 2022-10-16

## 2023-01-01 ENCOUNTER — ANCILLARY PROCEDURE (OUTPATIENT)
Dept: GENERAL RADIOLOGY | Facility: CLINIC | Age: 53
End: 2023-01-01
Attending: FAMILY MEDICINE
Payer: COMMERCIAL

## 2023-01-01 ENCOUNTER — OFFICE VISIT (OUTPATIENT)
Dept: URGENT CARE | Facility: URGENT CARE | Age: 53
End: 2023-01-01
Payer: COMMERCIAL

## 2023-01-01 VITALS
HEART RATE: 84 BPM | DIASTOLIC BLOOD PRESSURE: 76 MMHG | SYSTOLIC BLOOD PRESSURE: 106 MMHG | TEMPERATURE: 97.9 F | OXYGEN SATURATION: 96 % | RESPIRATION RATE: 18 BRPM

## 2023-01-01 DIAGNOSIS — R05.1 ACUTE COUGH: Primary | ICD-10-CM

## 2023-01-01 DIAGNOSIS — R05.1 ACUTE COUGH: ICD-10-CM

## 2023-01-01 PROCEDURE — 71046 X-RAY EXAM CHEST 2 VIEWS: CPT | Mod: TC | Performed by: RADIOLOGY

## 2023-01-01 PROCEDURE — 99214 OFFICE O/P EST MOD 30 MIN: CPT | Performed by: FAMILY MEDICINE

## 2023-01-01 RX ORDER — ALBUTEROL SULFATE 90 UG/1
AEROSOL, METERED RESPIRATORY (INHALATION)
COMMUNITY
End: 2024-04-16

## 2023-01-01 RX ORDER — BENZONATATE 100 MG/1
100-200 CAPSULE ORAL 3 TIMES DAILY PRN
Qty: 30 CAPSULE | Refills: 0 | Status: SHIPPED | OUTPATIENT
Start: 2023-01-01 | End: 2023-01-08

## 2023-01-01 RX ORDER — PREDNISONE 20 MG/1
60 TABLET ORAL DAILY
Qty: 15 TABLET | Refills: 0 | Status: SHIPPED | OUTPATIENT
Start: 2023-01-01 | End: 2023-01-06

## 2023-01-01 NOTE — PROGRESS NOTES
"  ICD-10-CM    1. Acute cough  R05.1 XR Chest 2 Views        Likely viral etiology.  NO evidence of pneumonia on chest x-ray.  No respiratory distress.    PLAN:  Patient Instructions   Prednisone 60 mg daily for 5 days.  Take as early in the day as you can.    Use benzonatate 1-2 pills every 8 hours as needed to reduce coughing.    Continue amoxicillin and albuterol as previously prescribed.    SUBJECTIVE:  Roxie Funez is a 52 year old female who presents to  today with cough x 2 weeks.  Has been feeling a lot more tired than usual with this illness.  Had low-grade fever at illness onset, but no fevers since.  Cough is worst in the morning.  She has been trying Mucinex as well as Dayquil/Nyquil.  On Tuesday she did a virtual visit and was started on amoxicillin and albuterol for \"bronchitis.\"  She doesn't feel like these have helped at all.  COVID test done at home on Tuesday was negative.    OBJECTIVE:  /76   Pulse 84   Temp 97.9  F (36.6  C) (Tympanic)   Resp 18   SpO2 96%   GEN: mildly ill-appearing, in NAD  ENT: TMs normal, oral MMM, There is cobblestoning of posterior pharynx.  Neck: no LAD  Lungs:  CTAB, decreased breath sounds at the bases  CV:  RRR, no murmurs    CXR: on my reading, there are no effusions, no cardiomegaly, and no focal infiltrates.  "

## 2023-01-01 NOTE — PATIENT INSTRUCTIONS
Prednisone 60 mg daily for 5 days.  Take as early in the day as you can.    Use benzonatate 1-2 pills every 8 hours as needed to reduce coughing.    Continue amoxicillin and albuterol as previously prescribed.

## 2023-02-15 ENCOUNTER — ANCILLARY PROCEDURE (OUTPATIENT)
Dept: MAMMOGRAPHY | Facility: CLINIC | Age: 53
End: 2023-02-15
Payer: COMMERCIAL

## 2023-02-15 DIAGNOSIS — Z12.31 VISIT FOR SCREENING MAMMOGRAM: ICD-10-CM

## 2023-02-15 PROCEDURE — 77067 SCR MAMMO BI INCL CAD: CPT | Mod: TC | Performed by: RADIOLOGY

## 2023-04-01 ENCOUNTER — HEALTH MAINTENANCE LETTER (OUTPATIENT)
Age: 53
End: 2023-04-01

## 2023-12-20 ENCOUNTER — APPOINTMENT (OUTPATIENT)
Dept: CT IMAGING | Facility: CLINIC | Age: 53
End: 2023-12-20
Attending: EMERGENCY MEDICINE
Payer: COMMERCIAL

## 2023-12-20 ENCOUNTER — HOSPITAL ENCOUNTER (EMERGENCY)
Facility: CLINIC | Age: 53
Discharge: HOME | End: 2023-12-20
Attending: EMERGENCY MEDICINE | Admitting: EMERGENCY MEDICINE
Payer: COMMERCIAL

## 2023-12-20 VITALS
SYSTOLIC BLOOD PRESSURE: 151 MMHG | RESPIRATION RATE: 20 BRPM | HEIGHT: 65 IN | OXYGEN SATURATION: 97 % | HEART RATE: 58 BPM | WEIGHT: 190 LBS | BODY MASS INDEX: 31.65 KG/M2 | DIASTOLIC BLOOD PRESSURE: 84 MMHG | TEMPERATURE: 98.2 F

## 2023-12-20 DIAGNOSIS — N20.1 URETERAL STONE: ICD-10-CM

## 2023-12-20 LAB
ALBUMIN UR-MCNC: 20 MG/DL
ANION GAP SERPL CALCULATED.3IONS-SCNC: 16 MMOL/L (ref 7–15)
APPEARANCE UR: ABNORMAL
BACTERIA #/AREA URNS HPF: ABNORMAL /HPF
BASOPHILS # BLD AUTO: 0.1 10E3/UL (ref 0–0.2)
BASOPHILS NFR BLD AUTO: 1 %
BILIRUB UR QL STRIP: NEGATIVE
BUN SERPL-MCNC: 15.2 MG/DL (ref 6–20)
CALCIUM SERPL-MCNC: 9.7 MG/DL (ref 8.6–10)
CAOX CRY #/AREA URNS HPF: ABNORMAL /HPF
CHLORIDE SERPL-SCNC: 102 MMOL/L (ref 98–107)
COLOR UR AUTO: YELLOW
CREAT SERPL-MCNC: 0.69 MG/DL (ref 0.51–0.95)
DEPRECATED HCO3 PLAS-SCNC: 20 MMOL/L (ref 22–29)
EGFRCR SERPLBLD CKD-EPI 2021: >90 ML/MIN/1.73M2
EOSINOPHIL # BLD AUTO: 0.3 10E3/UL (ref 0–0.7)
EOSINOPHIL NFR BLD AUTO: 2 %
ERYTHROCYTE [DISTWIDTH] IN BLOOD BY AUTOMATED COUNT: 12.7 % (ref 10–15)
GLUCOSE SERPL-MCNC: 158 MG/DL (ref 70–99)
GLUCOSE UR STRIP-MCNC: NEGATIVE MG/DL
HCG SER QL IA.RAPID: NEGATIVE
HCT VFR BLD AUTO: 44.4 % (ref 35–47)
HGB BLD-MCNC: 14.9 G/DL (ref 11.7–15.7)
HGB UR QL STRIP: NEGATIVE
HOLD SPECIMEN: NORMAL
IMM GRANULOCYTES # BLD: 0 10E3/UL
IMM GRANULOCYTES NFR BLD: 0 %
KETONES UR STRIP-MCNC: 10 MG/DL
LEUKOCYTE ESTERASE UR QL STRIP: NEGATIVE
LYMPHOCYTES # BLD AUTO: 3.8 10E3/UL (ref 0.8–5.3)
LYMPHOCYTES NFR BLD AUTO: 35 %
MCH RBC QN AUTO: 30.2 PG (ref 26.5–33)
MCHC RBC AUTO-ENTMCNC: 33.6 G/DL (ref 31.5–36.5)
MCV RBC AUTO: 90 FL (ref 78–100)
MONOCYTES # BLD AUTO: 0.4 10E3/UL (ref 0–1.3)
MONOCYTES NFR BLD AUTO: 4 %
MUCOUS THREADS #/AREA URNS LPF: PRESENT /LPF
NEUTROPHILS # BLD AUTO: 6.3 10E3/UL (ref 1.6–8.3)
NEUTROPHILS NFR BLD AUTO: 58 %
NITRATE UR QL: NEGATIVE
NRBC # BLD AUTO: 0 10E3/UL
NRBC BLD AUTO-RTO: 0 /100
PH UR STRIP: 5.5 [PH] (ref 5–7)
PLATELET # BLD AUTO: 408 10E3/UL (ref 150–450)
POTASSIUM SERPL-SCNC: 3.7 MMOL/L (ref 3.4–5.3)
RBC # BLD AUTO: 4.93 10E6/UL (ref 3.8–5.2)
RBC URINE: 13 /HPF
SODIUM SERPL-SCNC: 138 MMOL/L (ref 135–145)
SP GR UR STRIP: 1.03 (ref 1–1.03)
SQUAMOUS EPITHELIAL: 4 /HPF
UROBILINOGEN UR STRIP-MCNC: NORMAL MG/DL
WBC # BLD AUTO: 10.8 10E3/UL (ref 4–11)
WBC URINE: 3 /HPF

## 2023-12-20 PROCEDURE — 80048 BASIC METABOLIC PNL TOTAL CA: CPT | Performed by: STUDENT IN AN ORGANIZED HEALTH CARE EDUCATION/TRAINING PROGRAM

## 2023-12-20 PROCEDURE — 250N000013 HC RX MED GY IP 250 OP 250 PS 637: Performed by: EMERGENCY MEDICINE

## 2023-12-20 PROCEDURE — 99285 EMERGENCY DEPT VISIT HI MDM: CPT | Mod: 25

## 2023-12-20 PROCEDURE — 250N000011 HC RX IP 250 OP 636: Performed by: EMERGENCY MEDICINE

## 2023-12-20 PROCEDURE — 258N000003 HC RX IP 258 OP 636: Performed by: EMERGENCY MEDICINE

## 2023-12-20 PROCEDURE — 36415 COLL VENOUS BLD VENIPUNCTURE: CPT | Performed by: EMERGENCY MEDICINE

## 2023-12-20 PROCEDURE — 96375 TX/PRO/DX INJ NEW DRUG ADDON: CPT

## 2023-12-20 PROCEDURE — 84703 CHORIONIC GONADOTROPIN ASSAY: CPT

## 2023-12-20 PROCEDURE — 74176 CT ABD & PELVIS W/O CONTRAST: CPT

## 2023-12-20 PROCEDURE — 96376 TX/PRO/DX INJ SAME DRUG ADON: CPT

## 2023-12-20 PROCEDURE — 96374 THER/PROPH/DIAG INJ IV PUSH: CPT

## 2023-12-20 PROCEDURE — 96361 HYDRATE IV INFUSION ADD-ON: CPT

## 2023-12-20 PROCEDURE — 85025 COMPLETE CBC W/AUTO DIFF WBC: CPT | Performed by: EMERGENCY MEDICINE

## 2023-12-20 PROCEDURE — 81001 URINALYSIS AUTO W/SCOPE: CPT | Performed by: EMERGENCY MEDICINE

## 2023-12-20 RX ORDER — OXYCODONE HYDROCHLORIDE 5 MG/1
5 TABLET ORAL EVERY 6 HOURS PRN
Qty: 12 TABLET | Refills: 0 | Status: ON HOLD | OUTPATIENT
Start: 2023-12-20 | End: 2024-01-10

## 2023-12-20 RX ORDER — DIPHENHYDRAMINE HYDROCHLORIDE 50 MG/ML
25 INJECTION INTRAMUSCULAR; INTRAVENOUS ONCE
Status: DISCONTINUED | OUTPATIENT
Start: 2023-12-20 | End: 2023-12-20 | Stop reason: HOSPADM

## 2023-12-20 RX ORDER — DIPHENHYDRAMINE HYDROCHLORIDE 50 MG/ML
25 INJECTION INTRAMUSCULAR; INTRAVENOUS ONCE
Status: COMPLETED | OUTPATIENT
Start: 2023-12-20 | End: 2023-12-20

## 2023-12-20 RX ORDER — ONDANSETRON 2 MG/ML
8 INJECTION INTRAMUSCULAR; INTRAVENOUS ONCE
Status: COMPLETED | OUTPATIENT
Start: 2023-12-20 | End: 2023-12-20

## 2023-12-20 RX ORDER — KETOROLAC TROMETHAMINE 15 MG/ML
15 INJECTION, SOLUTION INTRAMUSCULAR; INTRAVENOUS ONCE
Status: COMPLETED | OUTPATIENT
Start: 2023-12-20 | End: 2023-12-20

## 2023-12-20 RX ORDER — TAMSULOSIN HYDROCHLORIDE 0.4 MG/1
0.4 CAPSULE ORAL DAILY
Qty: 10 CAPSULE | Refills: 0 | Status: SHIPPED | OUTPATIENT
Start: 2023-12-20 | End: 2023-12-30

## 2023-12-20 RX ORDER — ONDANSETRON 4 MG/1
4 TABLET, ORALLY DISINTEGRATING ORAL EVERY 8 HOURS PRN
Qty: 10 TABLET | Refills: 0 | Status: SHIPPED | OUTPATIENT
Start: 2023-12-20 | End: 2023-12-23

## 2023-12-20 RX ORDER — METOCLOPRAMIDE HYDROCHLORIDE 5 MG/ML
10 INJECTION INTRAMUSCULAR; INTRAVENOUS ONCE
Status: COMPLETED | OUTPATIENT
Start: 2023-12-20 | End: 2023-12-20

## 2023-12-20 RX ORDER — HYDROMORPHONE HYDROCHLORIDE 1 MG/ML
0.5 INJECTION, SOLUTION INTRAMUSCULAR; INTRAVENOUS; SUBCUTANEOUS
Status: COMPLETED | OUTPATIENT
Start: 2023-12-20 | End: 2023-12-20

## 2023-12-20 RX ORDER — OXYCODONE AND ACETAMINOPHEN 5; 325 MG/1; MG/1
2 TABLET ORAL ONCE
Status: COMPLETED | OUTPATIENT
Start: 2023-12-20 | End: 2023-12-20

## 2023-12-20 RX ADMIN — HYDROMORPHONE HYDROCHLORIDE 0.5 MG: 1 INJECTION, SOLUTION INTRAMUSCULAR; INTRAVENOUS; SUBCUTANEOUS at 17:25

## 2023-12-20 RX ADMIN — HYDROMORPHONE HYDROCHLORIDE 0.5 MG: 1 INJECTION, SOLUTION INTRAMUSCULAR; INTRAVENOUS; SUBCUTANEOUS at 16:22

## 2023-12-20 RX ADMIN — HYDROMORPHONE HYDROCHLORIDE 0.5 MG: 1 INJECTION, SOLUTION INTRAMUSCULAR; INTRAVENOUS; SUBCUTANEOUS at 14:42

## 2023-12-20 RX ADMIN — SODIUM CHLORIDE 1000 ML: 9 INJECTION, SOLUTION INTRAVENOUS at 14:43

## 2023-12-20 RX ADMIN — METOCLOPRAMIDE HYDROCHLORIDE 10 MG: 5 INJECTION INTRAMUSCULAR; INTRAVENOUS at 17:19

## 2023-12-20 RX ADMIN — DIPHENHYDRAMINE HYDROCHLORIDE 25 MG: 50 INJECTION, SOLUTION INTRAMUSCULAR; INTRAVENOUS at 17:19

## 2023-12-20 RX ADMIN — ONDANSETRON 8 MG: 2 INJECTION INTRAMUSCULAR; INTRAVENOUS at 14:42

## 2023-12-20 RX ADMIN — OXYCODONE HYDROCHLORIDE AND ACETAMINOPHEN 2 TABLET: 5; 325 TABLET ORAL at 14:17

## 2023-12-20 RX ADMIN — KETOROLAC TROMETHAMINE 15 MG: 15 INJECTION, SOLUTION INTRAMUSCULAR; INTRAVENOUS at 10:42

## 2023-12-20 ASSESSMENT — ACTIVITIES OF DAILY LIVING (ADL)
ADLS_ACUITY_SCORE: 35

## 2023-12-20 NOTE — ED TRIAGE NOTES
Pt c/o severe left flank and abdomen pain that started today suddenly around 0830. C/o nausea, no emesis, BM today that was normal.    Triage Assessment (Adult)       Row Name 12/20/23 1032          Triage Assessment    Airway WDL WDL        Respiratory WDL    Respiratory WDL WDL        Skin Circulation/Temperature WDL    Skin Circulation/Temperature WDL WDL        Cardiac WDL    Cardiac WDL WDL        Peripheral/Neurovascular WDL    Peripheral Neurovascular WDL WDL        Cognitive/Neuro/Behavioral WDL    Cognitive/Neuro/Behavioral WDL WDL

## 2023-12-20 NOTE — ED PROVIDER NOTES
"  History     Chief Complaint:  Abdominal Pain       HPI     Roxie Funez is a 53 year old female who presents to the ED for abdominal pain. Patient reports experiencing left flank pain this morning at 0900. She has severe LLQ pain that radiates to the left flank.  Patient reports not having a pain like since child birth. She took a dose of tylenol this morning but pain didn't improve. Pain is constant and without aggravating factors.  Other than abdominal pain, patient is experiencing some nausea and urinary sensation. History of hysterectomy in the past.     Independent Historian:   None - Patient Only    Review of External Notes:   None     Medications:    Albuterol   Amoxicillin     Past Medical History:    Lump or mass in breast   Major depressive disorder   Asthma   Raynaud's disease   Patellofemoral pain syndrome    Past Surgical History:    Left breast lumpectomy   Colonoscopy   Hysterectomy   Diagnostic laparoscopy   Tubal ligation   Salpingo oophorectomy      Physical Exam   Patient Vitals for the past 24 hrs:   BP Temp Temp src Pulse Resp SpO2 Height Weight   12/20/23 1940 (!) 151/84 -- -- -- -- 97 % -- --   12/20/23 1830 -- -- -- -- -- 99 % -- --   12/20/23 1755 -- -- -- -- -- 96 % -- --   12/20/23 1735 -- -- -- -- -- (!) 89 % -- --   12/20/23 1720 130/84 -- -- -- -- 96 % -- --   12/20/23 1420 (!) 154/84 -- -- 58 -- 90 % -- --   12/20/23 1057 (!) 123/92 98.2  F (36.8  C) Oral 60 20 100 % 1.651 m (5' 5\") 86.2 kg (190 lb)      Physical Exam      HEENT:    Oropharynx is moist  Eyes:    Conjunctiva normal  Neck:     Supple, no meningismus.     CV:     Regular rate and rhythm.      No murmurs, rubs or gallops.     No lower extremity edema.  PULM:    Clear to auscultation bilateral.       No respiratory distress.      Good air exchange.     No rales or wheezing.     No stridor.  ABD:    Soft, non-distended.       Moderate tenderness in the LLQ.     Bowel sounds normal.     No pulsatile masses.       No " rebound, guarding or rigidity.     No CVA tenderness.   MSK:     No gross deformity to all four extremities.   LYMPH:   No cervical lymphadenopathy.  NEURO:   Alert.  Good muscular tone, no atrophy.   Skin:    Warm, dry and intact.    Psych:    Mood is good and affect is appropriate.    Emergency Department Course     Imaging:  CT Abdomen Pelvis without Contrast (stone protocol)   Final Result   IMPRESSION:    1.  Obstructing 0.5 cm stone near the left ureterovesical junction   causing mild left hydronephrosis.   2.  Nonobstructing 0.3 cm stone in the interpolar region of the left   kidney.   3.  Mild hepatic steatosis.      JANNY VILLA MD            SYSTEM ID:  [id number]           Laboratory:  Labs Ordered and Resulted from Time of ED Arrival to Time of ED Departure   ROUTINE UA WITH MICROSCOPIC - Abnormal       Result Value    Color Urine Yellow      Appearance Urine Slightly Cloudy (*)     Glucose Urine Negative      Bilirubin Urine Negative      Ketones Urine 10 (*)     Specific Gravity Urine 1.032      Blood Urine Negative      pH Urine 5.5      Protein Albumin Urine 20 (*)     Urobilinogen Urine Normal      Nitrite Urine Negative      Leukocyte Esterase Urine Negative      Bacteria Urine Few (*)     Mucus Urine Present (*)     Calcium Oxalate Crystals Urine Many (*)     RBC Urine 13 (*)     WBC Urine 3      Squamous Epithelials Urine 4 (*)    BASIC METABOLIC PANEL - Abnormal    Sodium 138      Potassium 3.7      Chloride 102      Carbon Dioxide (CO2) 20 (*)     Anion Gap 16 (*)     Urea Nitrogen 15.2      Creatinine 0.69      GFR Estimate >90      Calcium 9.7      Glucose 158 (*)    ISTAT HCG QUALITATIVE PREGNANCY POCT - Normal    HCG Qualitative POCT Negative     CBC WITH PLATELETS AND DIFFERENTIAL    WBC Count 10.8      RBC Count 4.93      Hemoglobin 14.9      Hematocrit 44.4      MCV 90      MCH 30.2      MCHC 33.6      RDW 12.7      Platelet Count 408      % Neutrophils 58      % Lymphocytes 35      %  Monocytes 4      % Eosinophils 2      % Basophils 1      % Immature Granulocytes 0      NRBCs per 100 WBC 0      Absolute Neutrophils 6.3      Absolute Lymphocytes 3.8      Absolute Monocytes 0.4      Absolute Eosinophils 0.3      Absolute Basophils 0.1      Absolute Immature Granulocytes 0.0      Absolute NRBCs 0.0              Emergency Department Course & Assessments:      Interventions:  Medications   diphenhydrAMINE (BENADRYL) injection 25 mg (has no administration in time range)   sodium chloride 0.9% BOLUS 1,000 mL (0 mLs Intravenous Stopped 12/20/23 1624)   oxyCODONE-acetaminophen (PERCOCET) 5-325 MG per tablet 2 tablet (2 tablets Oral $Given 12/20/23 1417)   ketorolac (TORADOL) injection 15 mg (15 mg Intravenous $Given 12/20/23 1042)   ondansetron (ZOFRAN) injection 8 mg (8 mg Intravenous $Given 12/20/23 1442)   HYDROmorphone (PF) (DILAUDID) injection 0.5 mg (0.5 mg Intravenous $Given 12/20/23 1725)   metoclopramide (REGLAN) injection 10 mg (10 mg Intravenous $Given 12/20/23 1719)   diphenhydrAMINE (BENADRYL) injection 25 mg (25 mg Intravenous $Given 12/20/23 1719)        Independent Interpretation (X-rays, CTs, rhythm strip):  None    Consultations/Discussion of Management or Tests:     ED Course as of 12/20/23 2013   Wed Dec 20, 2023   1433 I obtained history and examined the patient as noted above.        Social Determinants of Health affecting care:   None    Disposition:  The patient was discharged to home.     Impression & Plan        Medical Decision Making:    Roxie Funez is a 53 year old female who presented with unilateral flank & abdominal pain consistent with renal colic. CT confirms a 5 mm ureteral stone at the UVJ.  Renal function is normal/baseline.  CT and lab workup show no other alternative etiology that could be causing her symptoms (e.g., AAA, appendicitis, pyelonephritis). There is no fever or convincing evidence of a urinary tract infection. On recheck, her pain is controlled  with interventions in the ED and she is tolerating POs. I will prescribe supportive medications and Flomax to facilitate stone passage. I have advised her to return for uncontrolled pain, vomiting, fever, or any other concerning symptoms. I also advised to strain her urine to look for a stone and submit it to her primary doctor for lab analysis.  Finally, I have advised follow up with urology within 3-5 days.        Diagnosis:    ICD-10-CM    1. Ureteral stone  N20.1            Discharge Medications:  Discharge Medication List as of 12/20/2023  7:30 PM        START taking these medications    Details   ondansetron (ZOFRAN ODT) 4 MG ODT tab Take 1 tablet (4 mg) by mouth every 8 hours as needed for nausea, Disp-10 tablet, R-0, Local Print      oxyCODONE (ROXICODONE) 5 MG tablet Take 1 tablet (5 mg) by mouth every 6 hours as needed for severe pain, Disp-12 tablet, R-0, Local Print      tamsulosin (FLOMAX) 0.4 MG capsule Take 1 capsule (0.4 mg) by mouth daily for 10 doses, Disp-10 capsule, R-0, Local Print            Scribe Disclosure:  I, Judith Roman, am serving as a scribe at 2:31 PM on 12/20/2023 to document services personally performed by Ricardo Magallanes MD based on my observations and the provider's statements to me.     12/20/2023   Ricardo Magallanes MD Matthews, Jeremiah R, MD  12/20/23 2017

## 2023-12-22 DIAGNOSIS — R69 DIAGNOSIS UNKNOWN: Primary | ICD-10-CM

## 2024-01-03 ENCOUNTER — VIRTUAL VISIT (OUTPATIENT)
Dept: UROLOGY | Facility: CLINIC | Age: 54
End: 2024-01-03
Payer: COMMERCIAL

## 2024-01-03 ENCOUNTER — PREP FOR PROCEDURE (OUTPATIENT)
Dept: UROLOGY | Facility: CLINIC | Age: 54
End: 2024-01-03

## 2024-01-03 DIAGNOSIS — N20.1 LEFT URETERAL STONE: Primary | ICD-10-CM

## 2024-01-03 DIAGNOSIS — N20.1 URETEROLITHIASIS: Primary | ICD-10-CM

## 2024-01-03 DIAGNOSIS — R69 DIAGNOSIS UNKNOWN: ICD-10-CM

## 2024-01-03 PROCEDURE — 99204 OFFICE O/P NEW MOD 45 MIN: CPT | Mod: 95 | Performed by: NURSE PRACTITIONER

## 2024-01-03 RX ORDER — TAMSULOSIN HYDROCHLORIDE 0.4 MG/1
0.4 CAPSULE ORAL DAILY
Qty: 30 CAPSULE | Refills: 0 | Status: SHIPPED | OUTPATIENT
Start: 2024-01-03 | End: 2024-04-16

## 2024-01-03 RX ORDER — ONDANSETRON 4 MG/1
4-8 TABLET, ORALLY DISINTEGRATING ORAL EVERY 8 HOURS PRN
Qty: 30 TABLET | Refills: 0 | Status: SHIPPED | OUTPATIENT
Start: 2024-01-03

## 2024-01-03 RX ORDER — OXYCODONE HYDROCHLORIDE 5 MG/1
5 TABLET ORAL EVERY 6 HOURS PRN
Qty: 16 TABLET | Refills: 0 | Status: SHIPPED | OUTPATIENT
Start: 2024-01-03 | End: 2024-01-06

## 2024-01-03 ASSESSMENT — PAIN SCALES - GENERAL: PAINLEVEL: EXTREME PAIN (9)

## 2024-01-03 NOTE — LETTER
[facility]OLOG[facility] [place]  [address]  [address]  [facility] [address]  Phone: [phone]  Fax: [phone]    January 3, 2024        Roxie Funez  [facility] [facility] [address]          To whom it may concern:    RE: Roxie CHANA Funez    Patient was seen and treated today by our clinic. Please excuse Roxie from work.      Sincerely,      Lainey Lunsford

## 2024-01-03 NOTE — NURSING NOTE
Is the patient currently in the state of [place]? YES    Visit mode:VIDEO    If the visit is dropped, the patient can be reconnected by: VIDEO VISIT: Text to cell phone:   Telephone Information:   Mobile [phone]       Will anyone else be joining the visit? NO  (If patient encounters technical issues they should call [phone]    How would you like to obtain your AVS? MyChart    Are changes needed to the allergy or medication list? No    Reason for visit: No chief complaint on file.    Laila ROJO

## 2024-01-03 NOTE — PATIENT INSTRUCTIONS
Emergency return precautions include fevers, chills, diaphoresis, uncontrollable N/V, high grade gross hematuria, severe pain that is refractory to medications. Proceed to the ER with development of any of these symptoms.

## 2024-01-03 NOTE — LETTER
1/3/2024       RE: Roxie Funez  [address] [facility] [address]     Dear Colleague,    Thank you for referring your patient, Roxie Funez, to the [facility] [place] [facility]. Please see a copy of my visit note below.    Consult conducted via real-time audio/video technology by JOHN Núñez CNP from [facility] to the patient in their home. Patient consented to this billed visit that was started at 2:36 and completed at 2:48.    Urology Virtual Visit    Name: Roxie Funez    MRN: [id number]   YOB: 1970               Chief Complaint:   ureterolith         Impression and Plan:   Impression / Plan:   Roxie Funez is a 53 year old female with Obstructing 0.5 cm stone near the left ureterovesical junction causing mild left hydronephrosis.     Symptoms are not currently satisfactorily controlled. Given size and location of stone, lack of suspicion for urosepsis... it is reasonable to send patient home with plan for definitive procedural stone management to be scheduled with Dr. Flores.  - Continue tamsulosin 0.4 mg daily. Refill provided. Zofran and oxy refilled.  - Continue to push fluids.   -If the stone passes prior to f/u appointment, pt will bring in for stone analysis.  -We covered treatment options including MET vs URS.  Risks including ureteral or bladder injury, infection, hematuria, stent pain and irritation were discussed.    Thank you for the opportunity to participate in the care of Roxie Funez.     JOHN Quintero, CNP   Physicians - Department of Urology  [phone]          History of Present Illness:   Roxie Funez is a 53 year old female seen today in ER follow up for evaluation of the above. This was first detected on CT in the ED on 12/20 after the patient presented complaining of acute renal colic symptoms. The CT noted  Obstructing 0.5 cm stone near the left ureterovesical junction  causing mild left hydronephrosis. Nonobstructing 0.3 cm stone in the interpolar region of the left  Kidney noted as well. UA in the ED was negative for infection. BMP revealed Cr and Ca to be normal. The patient was discharged with a prescription for tamsulosin, oxycodone, and instructions to follow up with urology.    Currently, the patient is in severe pain, occurring daily, she is tearful due to the pain. She has ran out of her medications prescribed in the ED. The patient has been straining their urine with no captured stones thus far.     History is obtained from patient & EMR    Pertinent imaging reviewed:  CT ABDOMEN/PELVIS WITHOUT CONTRAST December 20, 2023 11:02 AM             Past Medical History:     Past Medical History:   Diagnosis Date     Lump or mass in breast     eval through [facility], with bx in 2/05.  Left upper pole mass.     Major depressive disorder, single episode, mild (H24)     well-managed at this time off medications            Past Surgical History:     Past Surgical History:   Procedure Laterality Date     BREAST LUMPECTOMY, RT/LT  2005    left     COLONOSCOPY N/A 6/22/2021    Procedure: COLONOSCOPY;  Surgeon: Jevon Cody MD;  Location: [facility]     HC DILATION/CURETTAGE DIAG/THER NON OB  2000    D & C     HC REMOVAL OF OVARY/TUBE(S)  5/2000    Salpingo-Oophorectomy, Unilateral       HC REMOVAL OF TONSILS,12+ Y/O       Z LIGATE FALLOPIAN TUBE,POSTPARTUM  1997    Tubal Ligation     Z NONSPECIFIC PROCEDURE  1999    Diagnostic Laparoscopy     Z TOTAL ABDOM HYSTERECTOMY  5/2000    Hysterectomy, Total Abdominal            Social History:     Social History     Tobacco Use     Smoking status: Never     Smokeless tobacco: Never   Substance Use Topics     Alcohol use: Yes     Alcohol/week: 20.0 standard drinks of alcohol     Comment: is cautious about intake due to family history            Family History:     Family  "History   Problem Relation Age of Onset     Cancer Mother         ovarian at age 30, no other family hx of ovarian or breast ca     Diabetes Father      Skin Cancer Father      Melanoma Father      Skin Cancer Paternal Grandmother      Melanoma Paternal Grandmother      Diabetes Paternal Grandfather      Alcohol/Drug Brother         both parents also have Etoh issues     Breast Cancer No family hx of      Cancer - colorectal No family hx of      C.A.D. No family hx of      Eye Disorder No family hx of         no glaucoma     Colon Cancer No family hx of             Allergies:     Allergies   Allergen Reactions     Sulfa Antibiotics             Medications:     Current Outpatient Medications   Medication Sig     oxyCODONE (ROXICODONE) 5 MG tablet Take 1 tablet (5 mg) by mouth every 6 hours as needed for severe pain     albuterol (PROAIR HFA/PROVENTIL HFA/VENTOLIN HFA) 108 (90 Base) MCG/ACT inhaler 1 puff as needed Inhalation every 4-6 hours as needed     amoxicillin-clavulanate (AUGMENTIN) 875-125 MG tablet Take 1 tablet by mouth 2 times daily     No current facility-administered medications for this visit.             Review of Systems:    ROS: See HPI for pertinent details.  Remainder of 10-point ROS negative.         Physical Exam:   VS:  T: Data Unavailable    HR: Data Unavailable    BP: Data Unavailable    RR: Data Unavailable     GEN:  Alert.  NAD.    HEENT:  Sclerae anicteric.    CV:  No obvious jugular venous distension  LUNGS: No respiratory distress, breathing comfortably wo accessory muscle use.  SKIN:  No visible rash  NEURO:  CN grossly intact.             Data:   All laboratory data reviewed:    No results found for: \"PSA\"  Lab Results   Component Value Date    CR 0.69 12/20/2023    CR 0.65 05/15/2020    CR 0.72 08/07/2018    CR 0.70 10/31/2017    CR 0.56 03/27/2012    CR 0.69 03/16/2009     Lab Results   Component Value Date    GFRESTIMATED >90 12/20/2023    GFRESTIMATED >90 05/15/2020    " GFRESTIMATED 86 08/07/2018    GFRESTIMATED >90 10/31/2017    GFRESTIMATED >90 03/27/2012    GFRESTIMATED >90 03/16/2009     Color Urine (no units)   Date Value   12/20/2023 Yellow   10/18/2013 Yellow     Appearance Urine (no units)   Date Value   12/20/2023 Slightly Cloudy (A)   10/18/2013 Clear     Glucose Urine (mg/dL)   Date Value   12/20/2023 Negative   10/18/2013 Negative     Bilirubin Urine (no units)   Date Value   12/20/2023 Negative   10/18/2013 Negative     Ketones Urine (mg/dL)   Date Value   12/20/2023 10 (A)   10/18/2013 Negative     Specific Gravity Urine (no units)   Date Value   12/20/2023 1.032   10/18/2013 1.025     pH Urine   Date Value   12/20/2023 5.5   10/18/2013 5.5 pH     Protein Albumin Urine (mg/dL)   Date Value   12/20/2023 20 (A)   10/18/2013 Negative     Urobilinogen Urine (EU/dL)   Date Value   10/18/2013 0.2     Nitrite Urine (no units)   Date Value   12/20/2023 Negative   10/18/2013 Negative     Leukocyte Esterase Urine (no units)   Date Value   12/20/2023 Negative   10/18/2013 Negative              Again, thank you for allowing me to participate in the care of your patient.      Sincerely,    JOHN Núñez CNP

## 2024-01-03 NOTE — PROGRESS NOTES
Consult conducted via real-time audio/video technology by JOHN Núñez CNP from [facility] to the patient in their home. Patient consented to this billed visit that was started at 2:36 and completed at 2:48.    Urology Virtual Visit    Name: Roxie Funez    MRN: [id number]   YOB: 1970               Chief Complaint:   ureterolith         Impression and Plan:   Impression / Plan:   Roxie Funez is a 53 year old female with Obstructing 0.5 cm stone near the left ureterovesical junction causing mild left hydronephrosis.     Symptoms are not currently satisfactorily controlled. Given size and location of stone, lack of suspicion for urosepsis... it is reasonable to send patient home with plan for definitive procedural stone management to be scheduled with Dr. Flores.  - Continue tamsulosin 0.4 mg daily. Refill provided. Zofran and oxy refilled.  - Continue to push fluids.   -If the stone passes prior to f/u appointment, pt will bring in for stone analysis.  -We covered treatment options including MET vs URS.  Risks including ureteral or bladder injury, infection, hematuria, stent pain and irritation were discussed.    Thank you for the opportunity to participate in the care of Roxie Funez.     JOHN Quintero, CNP   Physicians - Department of Urology  [phone]          History of Present Illness:   Roxie Funez is a 53 year old female seen today in ER follow up for evaluation of the above. This was first detected on CT in the ED on 12/20 after the patient presented complaining of acute renal colic symptoms. The CT noted Obstructing 0.5 cm stone near the left ureterovesical junction  causing mild left hydronephrosis. Nonobstructing 0.3 cm stone in the interpolar region of the left  Kidney noted as well. UA in the ED was negative for infection. BMP revealed Cr and Ca to be normal. The patient was discharged with a prescription for  tamsulosin, oxycodone, and instructions to follow up with urology.    Currently, the patient is in severe pain, occurring daily, she is tearful due to the pain. She has ran out of her medications prescribed in the ED. The patient has been straining their urine with no captured stones thus far.     History is obtained from patient & EMR    Pertinent imaging reviewed:  CT ABDOMEN/PELVIS WITHOUT CONTRAST December 20, 2023 11:02 AM             Past Medical History:     Past Medical History:   Diagnosis Date    Lump or mass in breast     eval through [facility], with bx in 2/05.  Left upper pole mass.    Major depressive disorder, single episode, mild (H24)     well-managed at this time off medications            Past Surgical History:     Past Surgical History:   Procedure Laterality Date    BREAST LUMPECTOMY, RT/LT  2005    left    COLONOSCOPY N/A 6/22/2021    Procedure: COLONOSCOPY;  Surgeon: Jevon Cody MD;  Location: [facility]    HC DILATION/CURETTAGE DIAG/THER NON OB  2000    D & C    HC REMOVAL OF OVARY/TUBE(S)  5/2000    Salpingo-Oophorectomy, Unilateral      HC REMOVAL OF TONSILS,12+ Y/O      ZZC LIGATE FALLOPIAN TUBE,POSTPARTUM  1997    Tubal Ligation    ZZC NONSPECIFIC PROCEDURE  1999    Diagnostic Laparoscopy    ZZ TOTAL ABDOM HYSTERECTOMY  5/2000    Hysterectomy, Total Abdominal            Social History:     Social History     Tobacco Use    Smoking status: Never    Smokeless tobacco: Never   Substance Use Topics    Alcohol use: Yes     Alcohol/week: 20.0 standard drinks of alcohol     Comment: is cautious about intake due to family history            Family History:     Family History   Problem Relation Age of Onset    Cancer Mother         ovarian at age 30, no other family hx of ovarian or breast ca    Diabetes Father     Skin Cancer Father     Melanoma Father     Skin Cancer Paternal Grandmother     Melanoma Paternal Grandmother     Diabetes Paternal Grandfather     Alcohol/Drug Brother          "both parents also have Etoh issues    Breast Cancer No family hx of     Cancer - colorectal No family hx of     C.A.D. No family hx of     Eye Disorder No family hx of         no glaucoma    Colon Cancer No family hx of             Allergies:     Allergies   Allergen Reactions    Sulfa Antibiotics             Medications:     Current Outpatient Medications   Medication Sig    oxyCODONE (ROXICODONE) 5 MG tablet Take 1 tablet (5 mg) by mouth every 6 hours as needed for severe pain    albuterol (PROAIR HFA/PROVENTIL HFA/VENTOLIN HFA) 108 (90 Base) MCG/ACT inhaler 1 puff as needed Inhalation every 4-6 hours as needed    amoxicillin-clavulanate (AUGMENTIN) 875-125 MG tablet Take 1 tablet by mouth 2 times daily     No current facility-administered medications for this visit.             Review of Systems:    ROS: See HPI for pertinent details.  Remainder of 10-point ROS negative.         Physical Exam:   VS:  T: Data Unavailable    HR: Data Unavailable    BP: Data Unavailable    RR: Data Unavailable     GEN:  Alert.  NAD.    HEENT:  Sclerae anicteric.    CV:  No obvious jugular venous distension  LUNGS: No respiratory distress, breathing comfortably wo accessory muscle use.  SKIN:  No visible rash  NEURO:  CN grossly intact.             Data:   All laboratory data reviewed:    No results found for: \"PSA\"  Lab Results   Component Value Date    CR 0.69 12/20/2023    CR 0.65 05/15/2020    CR 0.72 08/07/2018    CR 0.70 10/31/2017    CR 0.56 03/27/2012    CR 0.69 03/16/2009     Lab Results   Component Value Date    GFRESTIMATED >90 12/20/2023    GFRESTIMATED >90 05/15/2020    GFRESTIMATED 86 08/07/2018    GFRESTIMATED >90 10/31/2017    GFRESTIMATED >90 03/27/2012    GFRESTIMATED >90 03/16/2009     Color Urine (no units)   Date Value   12/20/2023 Yellow   10/18/2013 Yellow     Appearance Urine (no units)   Date Value   12/20/2023 Slightly Cloudy (A)   10/18/2013 Clear     Glucose Urine (mg/dL)   Date Value   12/20/2023 " Negative   10/18/2013 Negative     Bilirubin Urine (no units)   Date Value   12/20/2023 Negative   10/18/2013 Negative     Ketones Urine (mg/dL)   Date Value   12/20/2023 10 (A)   10/18/2013 Negative     Specific Gravity Urine (no units)   Date Value   12/20/2023 1.032   10/18/2013 1.025     pH Urine   Date Value   12/20/2023 5.5   10/18/2013 5.5 pH     Protein Albumin Urine (mg/dL)   Date Value   12/20/2023 20 (A)   10/18/2013 Negative     Urobilinogen Urine (EU/dL)   Date Value   10/18/2013 0.2     Nitrite Urine (no units)   Date Value   12/20/2023 Negative   10/18/2013 Negative     Leukocyte Esterase Urine (no units)   Date Value   12/20/2023 Negative   10/18/2013 Negative

## 2024-01-03 NOTE — Clinical Note
January 3, 2024       TO: Roxie Funez  [address] [place] [address]       DearMs.Dee Dee,    We are writing to inform you of your test results.    {[facility] results letter list:[id number]}    No results found from the In Basket message.    ***

## 2024-01-09 ENCOUNTER — ANESTHESIA EVENT (OUTPATIENT)
Dept: SURGERY | Facility: CLINIC | Age: 54
End: 2024-01-09
Payer: COMMERCIAL

## 2024-01-09 NOTE — ANESTHESIA PREPROCEDURE EVALUATION
Anesthesia Pre-Procedure Evaluation    Patient: Roxie Funez   MRN: [id number] : 1970        Procedure : Procedure(s):  CYSTOSCOPY, LEFT RETROGRADE PYELOGRAM, LEFT URETEROSCOPY WITH LASER LITHOTRIOPSY AND BASKET REMOVAL OF STONES, LEFT URETERAL STENT PLACEMENT          Past Medical History:   Diagnosis Date    Lump or mass in breast     eval through [facility], with bx in .  Left upper pole mass.    Major depressive disorder, single episode, mild (H24)     well-managed at this time off medications      Past Surgical History:   Procedure Laterality Date    BREAST LUMPECTOMY, RT/LT      left    COLONOSCOPY N/A 2021    Procedure: COLONOSCOPY;  Surgeon: Jevon Cody MD;  Location: [facility]    HC DILATION/CURETTAGE DIAG/THER NON OB      D & C    HC REMOVAL OF OVARY/TUBE(S)  2000    Salpingo-Oophorectomy, Unilateral      HC REMOVAL OF TONSILS,12+ Y/O      ZZC LIGATE FALLOPIAN TUBE,POSTPARTUM      Tubal Ligation    ZZC NONSPECIFIC PROCEDURE      Diagnostic Laparoscopy    ZZC TOTAL ABDOM HYSTERECTOMY  2000    Hysterectomy, Total Abdominal      Allergies   Allergen Reactions    Sulfa Antibiotics       Social History     Tobacco Use    Smoking status: Never    Smokeless tobacco: Never   Substance Use Topics    Alcohol use: Yes     Alcohol/week: 20.0 standard drinks of alcohol     Comment: is cautious about intake due to family history      Wt Readings from Last 1 Encounters:   23 86.2 kg (190 lb)        Anesthesia Evaluation   Pt has had prior anesthetic.     No history of anesthetic complications       ROS/MED HX  ENT/Pulmonary:     (+)     [place] risk factors,   obese,                             (-) tobacco use, asthma and sleep apnea   Neurologic:    (-) no seizures, no CVA and migraines   Cardiovascular:    (-) hypertension, CHF, arrhythmias, irregular heartbeat/palpitations and dyslipidemia   METS/Exercise Tolerance:     Hematologic:       Musculoskeletal:      "  GI/Hepatic:    (-) GERD and liver disease   Renal/Genitourinary: Comment: Obstructing 0.5 cm stone near the left ureterovesical junction causing mild left hydronephrosis    (+)       Nephrolithiasis ,    (-) renal disease   Endo:     (+)               Obesity,    (-) Type II DM and thyroid disease   Psychiatric/Substance Use:     (+) psychiatric history depression       Infectious Disease:       Malignancy:       Other:            Physical Exam    Airway        Mallampati: II   TM distance: > 3 FB   Neck ROM: full   Mouth opening: > 3 cm    Respiratory Devices and Support         Dental  no notable dental history         Cardiovascular   cardiovascular exam normal          Pulmonary   pulmonary exam normal        breath sounds clear to auscultation           OUTSIDE LABS:  CBC:   Lab Results   Component Value Date    WBC 10.8 12/20/2023    WBC 6.9 04/26/2021    HGB 14.9 12/20/2023    HGB 15.0 04/26/2021    HCT 44.4 12/20/2023    HCT 45.0 04/26/2021     12/20/2023     04/26/2021     BMP:   Lab Results   Component Value Date     12/20/2023     05/15/2020    POTASSIUM 3.7 12/20/2023    POTASSIUM 3.7 05/15/2020    CHLORIDE 102 12/20/2023    CHLORIDE 106 05/15/2020    CO2 20 (L) 12/20/2023    CO2 28 05/15/2020    BUN 15.2 12/20/2023    BUN 14 05/15/2020    CR 0.69 12/20/2023    CR 0.65 05/15/2020     (H) 12/20/2023    GLC 92 05/15/2020     COAGS: No results found for: \"PTT\", \"INR\", \"FIBR\"  POC: No results found for: \"BGM\", \"HCG\", \"HCGS\"  HEPATIC:   Lab Results   Component Value Date    ALBUMIN 3.9 10/31/2017    PROTTOTAL 7.3 10/31/2017    ALT 52 (H) 10/31/2017    AST 25 10/31/2017    ALKPHOS 59 10/31/2017    BILITOTAL 0.4 10/31/2017     OTHER:   Lab Results   Component Value Date    BAHMAN 9.7 12/20/2023    TSH 3.42 10/31/2017    T4 1.02 10/18/2002    CRP 1.0 06/07/2006    SED 8 04/26/2021       Anesthesia Plan    ASA Status:  2    NPO Status:  NPO Appropriate    Anesthesia Type: " "General.     - Airway: LMA   Induction: Intravenous.   Maintenance: Balanced.        Consents    Anesthesia Plan(s) and associated risks, benefits, and realistic alternatives discussed. Questions answered and patient/representative(s) expressed understanding.     - Discussed:     - Discussed with:  Patient      - Extended Intubation/Ventilatory Support Discussed: No.      - Patient is DNR/DNI Status: No     Use of blood products discussed: No .     Postoperative Care    Pain management: IV analgesics, Oral pain medications, Multi-modal analgesia.   PONV prophylaxis: Ondansetron (or other 5HT-3), Dexamethasone or Solumedrol, Background Propofol Infusion     Comments:               Chay Sanchez,     I have reviewed the pertinent notes and labs in the chart from the past 30 days and (re)examined the patient.  Any updates or changes from those notes are reflected in this note.              # Obesity: Estimated body mass index is 31.62 kg/m  as calculated from the following:    Height as of 12/20/23: 1.651 m (5' 5\").    Weight as of 12/20/23: 86.2 kg (190 lb).    "

## 2024-01-10 ENCOUNTER — HOSPITAL ENCOUNTER (OUTPATIENT)
Facility: CLINIC | Age: 54
Discharge: HOME | End: 2024-01-10
Attending: UROLOGY | Admitting: UROLOGY
Payer: COMMERCIAL

## 2024-01-10 ENCOUNTER — APPOINTMENT (OUTPATIENT)
Dept: GENERAL RADIOLOGY | Facility: CLINIC | Age: 54
End: 2024-01-10
Attending: UROLOGY
Payer: COMMERCIAL

## 2024-01-10 ENCOUNTER — ANESTHESIA (OUTPATIENT)
Dept: SURGERY | Facility: CLINIC | Age: 54
End: 2024-01-10
Payer: COMMERCIAL

## 2024-01-10 VITALS
WEIGHT: 204.4 LBS | RESPIRATION RATE: 16 BRPM | TEMPERATURE: 96.8 F | BODY MASS INDEX: 36.21 KG/M2 | HEIGHT: 63 IN | HEART RATE: 75 BPM | OXYGEN SATURATION: 100 % | DIASTOLIC BLOOD PRESSURE: 64 MMHG | SYSTOLIC BLOOD PRESSURE: 107 MMHG

## 2024-01-10 DIAGNOSIS — N20.1 LEFT URETERAL STONE: Primary | ICD-10-CM

## 2024-01-10 PROCEDURE — 250N000011 HC RX IP 250 OP 636: Performed by: UROLOGY

## 2024-01-10 PROCEDURE — 710N000009 HC RECOVERY PHASE 1, LEVEL 1, PER MIN: Performed by: UROLOGY

## 2024-01-10 PROCEDURE — 999N000141 HC STATISTIC PRE-PROCEDURE NURSING ASSESSMENT: Performed by: UROLOGY

## 2024-01-10 PROCEDURE — 52356 CYSTO/URETERO W/LITHOTRIPSY: CPT | Mod: LT | Performed by: UROLOGY

## 2024-01-10 PROCEDURE — 272N000001 HC OR GENERAL SUPPLY STERILE: Performed by: UROLOGY

## 2024-01-10 PROCEDURE — 250N000025 HC SEVOFLURANE, PER MIN: Performed by: UROLOGY

## 2024-01-10 PROCEDURE — 370N000017 HC ANESTHESIA TECHNICAL FEE, PER MIN: Performed by: UROLOGY

## 2024-01-10 PROCEDURE — 250N000009 HC RX 250: Performed by: UROLOGY

## 2024-01-10 PROCEDURE — 250N000011 HC RX IP 250 OP 636: Performed by: NURSE ANESTHETIST, CERTIFIED REGISTERED

## 2024-01-10 PROCEDURE — 710N000012 HC RECOVERY PHASE 2, PER MINUTE: Performed by: UROLOGY

## 2024-01-10 PROCEDURE — 250N000009 HC RX 250: Performed by: NURSE ANESTHETIST, CERTIFIED REGISTERED

## 2024-01-10 PROCEDURE — C1769 GUIDE WIRE: HCPCS | Performed by: UROLOGY

## 2024-01-10 PROCEDURE — 258N000003 HC RX IP 258 OP 636: Performed by: NURSE ANESTHETIST, CERTIFIED REGISTERED

## 2024-01-10 PROCEDURE — 360N000077 HC SURGERY LEVEL 4, PER MIN: Performed by: UROLOGY

## 2024-01-10 PROCEDURE — 82365 CALCULUS SPECTROSCOPY: CPT | Performed by: UROLOGY

## 2024-01-10 PROCEDURE — 258N000001 HC RX 258: Performed by: UROLOGY

## 2024-01-10 PROCEDURE — 999N000179 XR SURGERY CARM FLUORO LESS THAN 5 MIN W STILLS: Mod: TC

## 2024-01-10 PROCEDURE — C2617 STENT, NON-COR, TEM W/O DEL: HCPCS | Performed by: UROLOGY

## 2024-01-10 PROCEDURE — 74420 UROGRAPHY RTRGR +-KUB: CPT | Mod: 26 | Performed by: UROLOGY

## 2024-01-10 DEVICE — URETERAL STENT
Type: IMPLANTABLE DEVICE | Site: URETER | Status: FUNCTIONAL
Brand: POLARIS™ ULTRA

## 2024-01-10 RX ORDER — FENTANYL CITRATE 50 UG/ML
INJECTION, SOLUTION INTRAMUSCULAR; INTRAVENOUS PRN
Status: DISCONTINUED | OUTPATIENT
Start: 2024-01-10 | End: 2024-01-10

## 2024-01-10 RX ORDER — CIPROFLOXACIN 500 MG/1
500 TABLET, FILM COATED ORAL ONCE
Qty: 1 TABLET | Refills: 0 | Status: SHIPPED | OUTPATIENT
Start: 2024-01-10 | End: 2024-01-10

## 2024-01-10 RX ORDER — DEXAMETHASONE SODIUM PHOSPHATE 4 MG/ML
INJECTION, SOLUTION INTRA-ARTICULAR; INTRALESIONAL; INTRAMUSCULAR; INTRAVENOUS; SOFT TISSUE PRN
Status: DISCONTINUED | OUTPATIENT
Start: 2024-01-10 | End: 2024-01-10

## 2024-01-10 RX ORDER — FUROSEMIDE 10 MG/ML
INJECTION INTRAMUSCULAR; INTRAVENOUS PRN
Status: DISCONTINUED | OUTPATIENT
Start: 2024-01-10 | End: 2024-01-10

## 2024-01-10 RX ORDER — ONDANSETRON 2 MG/ML
INJECTION INTRAMUSCULAR; INTRAVENOUS PRN
Status: DISCONTINUED | OUTPATIENT
Start: 2024-01-10 | End: 2024-01-10

## 2024-01-10 RX ORDER — LABETALOL HYDROCHLORIDE 5 MG/ML
10 INJECTION, SOLUTION INTRAVENOUS
Status: DISCONTINUED | OUTPATIENT
Start: 2024-01-10 | End: 2024-01-10 | Stop reason: HOSPADM

## 2024-01-10 RX ORDER — CEFAZOLIN SODIUM/WATER 2 G/20 ML
2 SYRINGE (ML) INTRAVENOUS SEE ADMIN INSTRUCTIONS
Status: DISCONTINUED | OUTPATIENT
Start: 2024-01-10 | End: 2024-01-10 | Stop reason: HOSPADM

## 2024-01-10 RX ORDER — ONDANSETRON 4 MG/1
4 TABLET, ORALLY DISINTEGRATING ORAL EVERY 30 MIN PRN
Status: DISCONTINUED | OUTPATIENT
Start: 2024-01-10 | End: 2024-01-10 | Stop reason: HOSPADM

## 2024-01-10 RX ORDER — SODIUM CHLORIDE, SODIUM LACTATE, POTASSIUM CHLORIDE, CALCIUM CHLORIDE 600; 310; 30; 20 MG/100ML; MG/100ML; MG/100ML; MG/100ML
INJECTION, SOLUTION INTRAVENOUS CONTINUOUS PRN
Status: DISCONTINUED | OUTPATIENT
Start: 2024-01-10 | End: 2024-01-10

## 2024-01-10 RX ORDER — EPHEDRINE SULFATE 50 MG/ML
INJECTION, SOLUTION INTRAMUSCULAR; INTRAVENOUS; SUBCUTANEOUS PRN
Status: DISCONTINUED | OUTPATIENT
Start: 2024-01-10 | End: 2024-01-10

## 2024-01-10 RX ORDER — HYDROMORPHONE HCL IN WATER/PF 6 MG/30 ML
0.2 PATIENT CONTROLLED ANALGESIA SYRINGE INTRAVENOUS EVERY 5 MIN PRN
Status: DISCONTINUED | OUTPATIENT
Start: 2024-01-10 | End: 2024-01-10 | Stop reason: HOSPADM

## 2024-01-10 RX ORDER — SODIUM CHLORIDE, SODIUM LACTATE, POTASSIUM CHLORIDE, CALCIUM CHLORIDE 600; 310; 30; 20 MG/100ML; MG/100ML; MG/100ML; MG/100ML
INJECTION, SOLUTION INTRAVENOUS CONTINUOUS
Status: DISCONTINUED | OUTPATIENT
Start: 2024-01-10 | End: 2024-01-10 | Stop reason: HOSPADM

## 2024-01-10 RX ORDER — OXYCODONE HYDROCHLORIDE 5 MG/1
5 TABLET ORAL EVERY 6 HOURS PRN
Qty: 9 TABLET | Refills: 0 | Status: SHIPPED | OUTPATIENT
Start: 2024-01-10 | End: 2024-04-16

## 2024-01-10 RX ORDER — CEFAZOLIN SODIUM/WATER 2 G/20 ML
2 SYRINGE (ML) INTRAVENOUS
Status: COMPLETED | OUTPATIENT
Start: 2024-01-10 | End: 2024-01-10

## 2024-01-10 RX ORDER — MAGNESIUM HYDROXIDE 1200 MG/15ML
LIQUID ORAL PRN
Status: DISCONTINUED | OUTPATIENT
Start: 2024-01-10 | End: 2024-01-10 | Stop reason: HOSPADM

## 2024-01-10 RX ORDER — FENTANYL CITRATE 50 UG/ML
25 INJECTION, SOLUTION INTRAMUSCULAR; INTRAVENOUS EVERY 5 MIN PRN
Status: DISCONTINUED | OUTPATIENT
Start: 2024-01-10 | End: 2024-01-10 | Stop reason: HOSPADM

## 2024-01-10 RX ORDER — FENTANYL CITRATE 50 UG/ML
50 INJECTION, SOLUTION INTRAMUSCULAR; INTRAVENOUS EVERY 5 MIN PRN
Status: DISCONTINUED | OUTPATIENT
Start: 2024-01-10 | End: 2024-01-10 | Stop reason: HOSPADM

## 2024-01-10 RX ORDER — LIDOCAINE HYDROCHLORIDE 20 MG/ML
INJECTION, SOLUTION INFILTRATION; PERINEURAL PRN
Status: DISCONTINUED | OUTPATIENT
Start: 2024-01-10 | End: 2024-01-10

## 2024-01-10 RX ORDER — ONDANSETRON 2 MG/ML
4 INJECTION INTRAMUSCULAR; INTRAVENOUS EVERY 30 MIN PRN
Status: DISCONTINUED | OUTPATIENT
Start: 2024-01-10 | End: 2024-01-10 | Stop reason: HOSPADM

## 2024-01-10 RX ORDER — OXYBUTYNIN CHLORIDE 5 MG/1
5 TABLET, EXTENDED RELEASE ORAL DAILY
Qty: 10 TABLET | Refills: 0 | Status: SHIPPED | OUTPATIENT
Start: 2024-01-10 | End: 2024-01-20

## 2024-01-10 RX ORDER — HYDROMORPHONE HCL IN WATER/PF 6 MG/30 ML
0.4 PATIENT CONTROLLED ANALGESIA SYRINGE INTRAVENOUS EVERY 5 MIN PRN
Status: DISCONTINUED | OUTPATIENT
Start: 2024-01-10 | End: 2024-01-10 | Stop reason: HOSPADM

## 2024-01-10 RX ORDER — KETOROLAC TROMETHAMINE 30 MG/ML
INJECTION, SOLUTION INTRAMUSCULAR; INTRAVENOUS PRN
Status: DISCONTINUED | OUTPATIENT
Start: 2024-01-10 | End: 2024-01-10

## 2024-01-10 RX ORDER — PROPOFOL 10 MG/ML
INJECTION, EMULSION INTRAVENOUS PRN
Status: DISCONTINUED | OUTPATIENT
Start: 2024-01-10 | End: 2024-01-10

## 2024-01-10 RX ADMIN — DEXAMETHASONE SODIUM PHOSPHATE 4 MG: 4 INJECTION, SOLUTION INTRA-ARTICULAR; INTRALESIONAL; INTRAMUSCULAR; INTRAVENOUS; SOFT TISSUE at 09:36

## 2024-01-10 RX ADMIN — Medication 7.5 MG: at 09:48

## 2024-01-10 RX ADMIN — Medication 5 MG: at 10:12

## 2024-01-10 RX ADMIN — FENTANYL CITRATE 50 MCG: 50 INJECTION INTRAMUSCULAR; INTRAVENOUS at 09:28

## 2024-01-10 RX ADMIN — SODIUM CHLORIDE, POTASSIUM CHLORIDE, SODIUM LACTATE AND CALCIUM CHLORIDE: 600; 310; 30; 20 INJECTION, SOLUTION INTRAVENOUS at 09:27

## 2024-01-10 RX ADMIN — Medication 5 MG: at 10:04

## 2024-01-10 RX ADMIN — Medication 7.5 MG: at 09:51

## 2024-01-10 RX ADMIN — LIDOCAINE HYDROCHLORIDE 80 MG: 20 INJECTION, SOLUTION INFILTRATION; PERINEURAL at 09:30

## 2024-01-10 RX ADMIN — Medication 2 G: at 09:22

## 2024-01-10 RX ADMIN — MIDAZOLAM 2 MG: 1 INJECTION INTRAMUSCULAR; INTRAVENOUS at 09:28

## 2024-01-10 RX ADMIN — FUROSEMIDE 20 MG: 10 INJECTION, SOLUTION INTRAVENOUS at 10:16

## 2024-01-10 RX ADMIN — KETOROLAC TROMETHAMINE 30 MG: 30 INJECTION, SOLUTION INTRAMUSCULAR at 10:16

## 2024-01-10 RX ADMIN — FENTANYL CITRATE 50 MCG: 50 INJECTION INTRAMUSCULAR; INTRAVENOUS at 10:00

## 2024-01-10 RX ADMIN — PROPOFOL 150 MG: 10 INJECTION, EMULSION INTRAVENOUS at 09:28

## 2024-01-10 RX ADMIN — ONDANSETRON 4 MG: 2 INJECTION INTRAMUSCULAR; INTRAVENOUS at 09:36

## 2024-01-10 ASSESSMENT — ACTIVITIES OF DAILY LIVING (ADL)
ADLS_ACUITY_SCORE: 35
ADLS_ACUITY_SCORE: 33
ADLS_ACUITY_SCORE: 35

## 2024-01-10 ASSESSMENT — ENCOUNTER SYMPTOMS
SEIZURES: 0
DYSRHYTHMIAS: 0

## 2024-01-10 ASSESSMENT — LIFESTYLE VARIABLES: TOBACCO_USE: 0

## 2024-01-10 NOTE — OP NOTE
OPERATIVE REPORT  DATE OF SURGERY: 01/10/24  LOCATION OF SURGERY: [facility]  PREOPERATIVE DIAGNOSIS:  (N20.1) Left ureteral stone  (primary encounter diagnosis)  POSTOPERATIVE DIAGNOSIS: (N20.1) Left ureteral stone  (primary encounter diagnosis)     START TIME: 9:42 AM  END TIME: 10:14 AM    PROCEDURE PERFORMED:   1. Cystoscopy  2. LEFT retrograde pyelogram  3. LEFT ureteroscopy with laser lithotripsy  4. LEFT ureteroscopy with basketing of stones  5. LEFT JJ stent placement  6. <1hr physician fluoroscopy time      STAFF SURGEON: Brett Flores MD  ANESTHESIA: General.   ESTIMATED BLOOD LOSS: 2 mL.   DRAINS AND TUBES: LEFT 6fr x 24cm ureteral stent, 16fr betancourt catheter  COMPLICATIONS: None.   DISPOSITION: PACU.   SPECIMENS OBTAINED:   ID Type Source Tests Collected by Time Destination   A : left ureteral stones Calculus/Stone Ureter, Left STONE ANALYSIS Brett Flores MD 1/10/2024 10:10 AM       SIGNIFICANT FINDINGS: LEFT distal ureteral stone with severe impaction fragmented and removed     HISTORY OF PRESENT ILLNESS: Roxie Funez is a 53 year old female who presented to the emergency room on 12/20/2023 with left-sided flank pain and a CT scan showing a 5 mm left distal ureteral stone with associated hydroureteronephrosis.  She was initiated on medical expulsive therapy, however was unable to pass the stone with persistent pain and nausea.  She was counseled on treatment options and elects to proceed with the above surgery.    OPERATION PERFORMED:   Informed consent was obtained and the patient was brought to the operating room where general anesthesia was induced. The patient was given appropriate preoperative antibiotics and positioned supine. The patient was then repositioned in dorsal lithotomy with all pressure points padded. We then performed a timeout, verifying the correct patient's site and procedure to be performed.    The 22 Vietnamese cystoscope was inserted atraumatically into the bladder.   Cystoscopy was performed with no evidence of stones in the bladder.  The left ureteral orifice identified and cannulated with a 0.035 sensor wire, however this would not pass beyond the stone and the cystoscope was removed.  A semirigid ureteroscope was assembled and inserted atraumatically into the bladder.  The ureteral orifice was identified and cannulated and the 0.035 sensor wire was able to advance up to the renal pelvis and the ureteroscope was removed.  Ureteroscope was replaced in the bladder and using the Amplatz Super Stiff wire and a roller technique the ureteral orifice was cannulated and the ureteroscope was advanced 1 cm up the ureter where the stone was encountered with severe impaction.  A gentle retrograde pyelogram was performed with evidence of hydroureteronephrosis to the level of the stone.  The stone was dusted and fragmented with holmium laser lithotripsy.  All stone fragments were then basket and removed.  The ureteroscope was able to advance into the mid ureter to ensure all stone fragments were removed and again a repeat retrograde pyelogram was performed with no further evidence of stone fragments.  The ureteroscope was removed with no evidence of ureteral injury though there was significant edema at the site of prior stone impaction.  A 6 Ecuadorean by 24 cm JJ ureteral stent was advanced over the wire with good curl noted in the renal pelvis and the bladder fluoroscopically.  A 16 Ecuadorean Arora catheter was placed with 10 cc in the balloon.  She received 20 mg IV Lasix and 30 mg IV Toradol.  She was emerged from anesthesia and taken to the recovery room in stable condition    Plan:  - Arora catheter may be removed when she is fully emerged from anesthesia  - Given the stone impaction we will plan for a renal ultrasound 4 to 6 weeks after stent removal    Brett Flores MD   Urology  [facility]  Clinic Phone [phone]

## 2024-01-10 NOTE — ANESTHESIA CARE TRANSFER NOTE
Patient: Roxie Funez    Procedure: Procedure(s):  CYSTOSCOPY, LEFT RETROGRADE PYELOGRAM, LEFT URETEROSCOPY WITH LASER LITHOTRIOPSY AND BASKET REMOVAL OF STONES, LEFT URETERAL STENT PLACEMENT       Diagnosis: Left ureteral stone [N20.1]  Diagnosis Additional Information: No value filed.    Anesthesia Type:   General     Note:    Oropharynx: oropharynx clear of all foreign objects  Level of Consciousness: awake  Oxygen Supplementation: face mask  Level of Supplemental Oxygen (L/min / FiO2): 6  Independent Airway: airway patency satisfactory and stable  Dentition: dentition unchanged  Vital Signs Stable: post-procedure vital signs reviewed and stable  Report to RN Given: handoff report given  Patient transferred to: PACU    Handoff Report: Identifed the Patient, Identified the Reponsible Provider, Reviewed the pertinent medical history, Discussed the surgical course, Reviewed Intra-OP anesthesia mangement and issues during anesthesia, Set expectations for post-procedure period and Allowed opportunity for questions and acknowledgement of understanding      Vitals:  Vitals Value Taken Time   /66 01/10/24 1030   Temp 36.4  C (97.6  F) 01/10/24 1025   Pulse 71 01/10/24 1035   Resp 7 01/10/24 1035   SpO2 96 % 01/10/24 1035   Vitals shown include unfiled device data.    Electronically Signed By: JOHN Ivey CRNA  January 10, 2024  10:37 AM

## 2024-01-10 NOTE — DISCHARGE INSTRUCTIONS
POSTOPERATIVE INSTRUCTIONS    Diagnosis-------------------------------   LEFT ureteral stone    Procedure-------------------------------  Procedure(s) (LRB):  CYSTOSCOPY, LEFT RETROGRADE PYELOGRAM, LEFT URETEROSCOPY WITH LASER LITHOTRIOPSY AND BASKET REMOVAL OF STONES, LEFT URETERAL STENT PLACEMENT (Left)      Findings--------------------------------  LEFT ureteral stone fragmented and removed    Home-going instructions-----------------         Activity Limitation:     - No driving or operating heavy machinery while on narcotic pain medication.     FOLLOW THESE INSTRUCTIONS AS INDICATED BELOW:  - Observe operative area for signs of excessive bleeding.  - You may shower.  - Increase fluid intake to promote clear urine.  - Resume usual diet as tolerated    What to expect while recovering-----------  - You may experience some intermittent bleeding that makes your urine pink or cherry colored. This is normal.  - However, if you are unable to urinate, passing large amount of clots, have mandy blood in your urine, or have a temperature >101 degrees, call the urology nurse on call, or present to your nearest emergency department.  - You are encouraged to walk daily, and have no activity restrictions.   - A URETERAL STENT has been placed that allows urine to flow unobstructed from your kidney into your bladder.  The stent has a curl in the kidney and a curl in the bladder.  The curl in the bladder can cause some urgency and frequency of urination as well as some mild blood in the urine.  The curl in the kidney can cause some mild flank discomfort.  This may be more noticeable when you urinate.  A URETERAL STENT is meant to be left in temporarily.  It must be removed or changed no later than 3 months after it's insertion.  If it's not removed it can result in stone overgrowth on the stent that can cause pain, infection, and can be very difficult to remove.      Discharge Medications/instructions:   - Flomax (tamsulosin) to  be taken daily until stent is removed  - Oxybutynin 5mg XL (Ditropan XL) to be taken daily until ureteral stent is removed  - Take Tylenol 1000mg every 6 hours for pain  - Take Ibuprofen 600mg every 6 hours as needed for additional pain control  - Take Oxycodone 5mg every 4-6 hours only for break through pain  - Take Colace while taking Oxycodone to prevent constipation      Questions/concerns------------------------  [facility]: [phone]    Future appointments  You are scheduled for follow up on 1/19/2024    Brett Flores MD       Today you received Toradol, an antiinflammatory medication similar to Ibuprofen.  You should not take other antiinflammatory medication, such as Ibuprofen, Motrin, Advil, Aleve, Naprosyn, etc until 4:15 pm.     Same Day Surgery Discharge Instructions for  Sedation and General Anesthesia     It's not unusual to feel dizzy, light-headed or faint for up to 24 hours after surgery or while taking pain medication.  If you have these symptoms: sit for a few minutes before standing and have someone assist you when you get up to walk or use the bathroom.    You should rest and relax for the next 24 hours. We recommend you make arrangements to have an adult stay with you for at least 24 hours after your discharge.  Avoid hazardous and strenuous activity.    DO NOT DRIVE any vehicle or operate mechanical equipment for 24 hours following the end of your surgery.  Even though you may feel normal, your reactions may be affected by the medication you have received.    Do not drink alcoholic beverages for 24 hours following surgery.     Slowly progress to your regular diet as you feel able. It's not unusual to feel nauseated and/or vomit after receiving anesthesia.  If you develop these symptoms, drink clear liquids (apple juice, ginger ale, broth, 7-up, etc. ) until you feel better.  If your nausea and vomiting persists for 24 hours, please notify your surgeon.      All narcotic  pain medications, along with inactivity and anesthesia, can cause constipation. Drinking plenty of liquids and increasing fiber intake will help.    For any questions of a medical nature, call your surgeon.    Do not make important decisions for 24 hours.    If you had general anesthesia, you may have a sore throat for a couple of days related to the breathing tube used during surgery.  You may use Cepacol lozenges to help with this discomfort.  If it worsens or if you develop a fever, contact your surgeon.     If you feel your pain is not well managed with the pain medications prescribed by your surgeon, please contact your surgeon's office to let them know so they can address your concerns.      **If you have questions or concerns about your procedure,   call Dr. Flores at [phone]**

## 2024-01-10 NOTE — OR NURSING
Patient states at 0600 this morning had left side pain and nausea. At 0600 this morning patient took oxycodone 5 mg and Zofran 4 mg.

## 2024-01-10 NOTE — ANESTHESIA POSTPROCEDURE EVALUATION
Patient: Roxie Funez    Procedure: Procedure(s):  CYSTOSCOPY, LEFT RETROGRADE PYELOGRAM, LEFT URETEROSCOPY WITH LASER LITHOTRIOPSY AND BASKET REMOVAL OF STONES, LEFT URETERAL STENT PLACEMENT       Anesthesia Type:  General    Note:  Disposition: Inpatient   Postop Pain Control: Uneventful            Sign Out: Well controlled pain   PONV: No   Neuro/Psych: Uneventful            Sign Out: Acceptable/Baseline neuro status   Airway/Respiratory: Uneventful            Sign Out: Acceptable/Baseline resp. status   CV/Hemodynamics: Uneventful            Sign Out: Acceptable CV status; No obvious hypovolemia; No obvious fluid overload   Other NRE: NONE   DID A NON-ROUTINE EVENT OCCUR? No           Last vitals:  Vitals Value Taken Time   /74 01/10/24 1100   Temp 36.4  C (97.6  F) 01/10/24 1025   Pulse 72 01/10/24 1112   Resp 11 01/10/24 1112   SpO2 97 % 01/10/24 1112   Vitals shown include unfiled device data.    Electronically Signed By: Chay Sanchez DO  January 10, 2024  3:10 PM

## 2024-01-10 NOTE — ANESTHESIA PROCEDURE NOTES
Airway       Patient location during procedure: OR  Staff -        CRNA: Jacey Polk APRN CRNA       Performed By: CRNA  Consent for Airway        Urgency: elective  Indications and Patient Condition       Indications for airway management: keren-procedural       Induction type:intravenous       Mask difficulty assessment: 0 - not attempted    Final Airway Details       Final airway type: supraglottic airway    Supraglottic Airway Details        Type: LMA       Brand: I-Gel       LMA size: 4    Post intubation assessment        Placement verified by: capnometry, equal breath sounds and chest rise        Number of attempts at approach: 1       Ease of procedure: easy       Dentition: Intact and Unchanged

## 2024-01-12 LAB
APPEARANCE STONE: NORMAL
COMPN STONE: NORMAL
SPECIMEN WT: 16 MG

## 2024-01-16 ENCOUNTER — PATIENT OUTREACH (OUTPATIENT)
Dept: CARE COORDINATION | Facility: CLINIC | Age: 54
End: 2024-01-16
Payer: COMMERCIAL

## 2024-01-19 ENCOUNTER — OFFICE VISIT (OUTPATIENT)
Dept: UROLOGY | Facility: CLINIC | Age: 54
End: 2024-01-19
Payer: COMMERCIAL

## 2024-01-19 VITALS
DIASTOLIC BLOOD PRESSURE: 78 MMHG | BODY MASS INDEX: 32.82 KG/M2 | HEIGHT: 65 IN | HEART RATE: 52 BPM | WEIGHT: 197 LBS | OXYGEN SATURATION: 94 % | SYSTOLIC BLOOD PRESSURE: 150 MMHG

## 2024-01-19 DIAGNOSIS — N20.1 LEFT URETERAL STONE: Primary | ICD-10-CM

## 2024-01-19 DIAGNOSIS — Z46.6 ENCOUNTER FOR REMOVAL OF URETERAL STENT: ICD-10-CM

## 2024-01-19 PROBLEM — F10.20 ALCOHOL DEPENDENCE (H): Status: ACTIVE | Noted: 2024-01-19

## 2024-01-19 PROCEDURE — 52310 CYSTOSCOPY AND TREATMENT: CPT | Performed by: UROLOGY

## 2024-01-19 RX ORDER — LIDOCAINE HYDROCHLORIDE 20 MG/ML
JELLY TOPICAL ONCE
Status: ACTIVE | OUTPATIENT
Start: 2024-01-19

## 2024-01-19 NOTE — PROGRESS NOTES
CYSTOSCOPY WITH URETERAL STENT REMOVAL PROCEDURE NOTE:    Roxie Funez is a 53 year old female who presents with ureteral stent for a cystoscopy.    Pt ID verified with patient:     Procedure verified with patient: Yes     Procedure confirmed with physician and support staff: Yes     Consent confirmed with physician and support staff.    Sign In:  History and Physical Exam reviewed  Primary Diagnosis: ureteral stent   Informed Consent Discussed: Yes   Sign in Communication: Yes   Time Out:  Team Confirms the Correct Patient, Correct Procedure; Yes , Correct Site and Site Marking, Correct Position (if applicable).  Affirmation of Time Out: Yes   Sign Out:  Sign Out Discussion: Yes     Roxie Funez is a 53 year old female with an indwelling ureteral stent in need of removal.    CYSTOSCOPY PROCEDURE:  After sterile preparation and draping of the patient,  a 17-Nauruan flexible cystoscope was introduced via the urethra.  It was passed without difficulty into the bladder.  The urethra was open without evidence of stricture.  The ureteral orifices were orthotopic.  The double J stent was seen coming out the left side.  It was grasped with an alligator forceps and extracted intact without difficulty.  The patient tolerated the procedure well.    STONE ANALYSIS:  Stone Composition See Note    Comment: Calculi composed primarily of:  20% calcium oxalate monohydrate, and  80% calcium oxalate dihydrate.       A/P Successful stent removal  Prophylactic antibiotic ordered   Stone prevention counseling provided today  -Reviewed her CT scan with a nonobstructive small 2 to 3 mm stone within the left parenchyma we discussed that this does not require any follow-up    Watch for any new onset fevers, signs of UTI.  May expect some pain after removal.  If this is severe, or last many hours, you may need to return for replacement of stent.    Brett Flores MD   Urology  [facility]

## 2024-01-19 NOTE — PATIENT INSTRUCTIONS
"STONE PREVENTION RECOMMENDATIONS:  - Drink enough water to make 2.5-3L of urine daily  - Add some squeezed lemon to the water to increase the citrate in your urine  - Consume a normal amount of calcium   - Limit your oxalate consumption    [email or web address]                        AFTER YOUR CYSTOSCOPY  ?  ?  You have just completed a cystoscopy, or \"cysto\", which allowed your physician to learn more about your bladder (or to remove a stent placed after surgery). We suggest that you continue to avoid caffeine, fruit juice, and alcohol for the next 24 hours, however, you are encouraged to return to your normal activities.  ?  ?  A few things that are considered normal after your cystoscopy:  ?  * small amount of bleeding (or spotting) that clears within the next 24 hours  ?  * slight burning sensation with urination  ?  * sensation of needing to void (urinate) more frequently  ?  * the feeling of \"air\" in your urine  ?  * mild discomfort that is relieved with Tylenol    * bladder spasms  ?  ?  ?  Please contact our office promptly if you:  ?  * develop a fever above 101 degrees  ?  * are unable to urinate  ?  * develop bright red blood that does not stop  ?  * experience severe pain or swelling  ?  ?  ?  And of course, please contact our office with any concerns or questions [phone].  ?    AFTER YOUR CYSTOSCOPY        You have just completed a cystoscopy, or \"cysto\", which allowed your physician to learn more about your bladder (or to remove a stent placed after surgery). We suggest that you continue to avoid caffeine, fruit juice, and alcohol for the next 24 hours, however, you are encouraged to return to your normal activities.         A few things that are considered normal after your cystoscopy:     * Small amount of bleeding (or spotting) that clears within the next 24 hours     * Slight burning sensation with urination     * Sensation " "to of needing to avoid more frequently     * The feeling of \"air\" in your urine     * Mild discomfort that is relieved with Tylenol        Please contact our office promptly if you:     * Develop a fever above 101 degrees     * Are unable to urinate     * Develop bright red blood that does not stop     * Severe pain or swelling         Please contact our office with any concerns or questions @[facility].  "

## 2024-01-19 NOTE — ADDENDUM NOTE
Addended by: KATYA GOODWIN on: 1/19/2024 11:31 AM     Modules accepted: Orders     no fever/no nausea/no pain/no vomiting

## 2024-01-19 NOTE — NURSING NOTE
Chief Complaint   Patient presents with    stone     Patient here today for Cystoscopy Stent removal             5mL 2% lidocaine hydrochloride Urojet instilled into urethra.    NDC# [address]-9  Lot #: [id number]  Expiration Date:  04/25    Prior to the start of the procedure and with procedural staff participation, I verbally confirmed the patient s identity using two indicators, relevant allergies, that the procedure was appropriate and matched the consent or emergent situation, and that the correct equipment/implants were there re-confirmed the patient s name, procedure, and site/side. I have wiped the patient off with the povidone-Iodine solution, draped them,  used Lidocaine hydrochloride jelly, and instilled sterile water into the bladder. (The Joint Commission universal protocol was followed.)  Yes    Sedation (Moderate or Deep): None              TRAV Petty

## 2024-01-19 NOTE — LETTER
1/19/2024       RE: Roxie Funez  [address] [facility] [address]     Dear Colleague,    Thank you for referring your patient, Roxie Funez, to the [facility] [place] at [facility]. Please see a copy of my visit note below.    CYSTOSCOPY WITH URETERAL STENT REMOVAL PROCEDURE NOTE:    Roxie Fnuez is a 53 year old female who presents with ureteral stent for a cystoscopy.    Pt ID verified with patient:     Procedure verified with patient: Yes     Procedure confirmed with physician and support staff: Yes     Consent confirmed with physician and support staff.    Sign In:  History and Physical Exam reviewed  Primary Diagnosis: ureteral stent   Informed Consent Discussed: Yes   Sign in Communication: Yes   Time Out:  Team Confirms the Correct Patient, Correct Procedure; Yes , Correct Site and Site Marking, Correct Position (if applicable).  Affirmation of Time Out: Yes   Sign Out:  Sign Out Discussion: Yes     Roxie Funez is a 53 year old female with an indwelling ureteral stent in need of removal.    CYSTOSCOPY PROCEDURE:  After sterile preparation and draping of the patient,  a 17-St Lucian flexible cystoscope was introduced via the urethra.  It was passed without difficulty into the bladder.  The urethra was open without evidence of stricture.  The ureteral orifices were orthotopic.  The double J stent was seen coming out the left side.  It was grasped with an alligator forceps and extracted intact without difficulty.  The patient tolerated the procedure well.    STONE ANALYSIS:  Stone Composition See Note    Comment: Calculi composed primarily of:  20% calcium oxalate monohydrate, and  80% calcium oxalate dihydrate.       A/P Successful stent removal  Prophylactic antibiotic ordered   Stone prevention counseling provided today  -Reviewed her CT scan with a nonobstructive small 2 to 3 mm stone within the left parenchyma we  discussed that this does not require any follow-up    Watch for any new onset fevers, signs of UTI.  May expect some pain after removal.  If this is severe, or last many hours, you may need to return for replacement of stent.    Brett Flores MD   Urology  [facility]

## 2024-02-27 ENCOUNTER — PATIENT OUTREACH (OUTPATIENT)
Dept: CARE COORDINATION | Facility: CLINIC | Age: 54
End: 2024-02-27
Payer: COMMERCIAL

## 2024-03-12 ENCOUNTER — PATIENT OUTREACH (OUTPATIENT)
Dept: CARE COORDINATION | Facility: CLINIC | Age: 54
End: 2024-03-12
Payer: COMMERCIAL

## 2024-04-11 ENCOUNTER — VIRTUAL VISIT (OUTPATIENT)
Dept: UROLOGY | Facility: CLINIC | Age: 54
End: 2024-04-11
Payer: COMMERCIAL

## 2024-04-11 DIAGNOSIS — N20.0 NEPHROLITHIASIS: ICD-10-CM

## 2024-04-11 DIAGNOSIS — R10.9 LEFT FLANK PAIN: Primary | ICD-10-CM

## 2024-04-11 PROCEDURE — 99214 OFFICE O/P EST MOD 30 MIN: CPT | Mod: 95 | Performed by: UROLOGY

## 2024-04-11 ASSESSMENT — PAIN SCALES - GENERAL: PAINLEVEL: MODERATE PAIN (5)

## 2024-04-11 NOTE — PROGRESS NOTES
[facility]   CHIEF COMPLAINT   It was my pleasure to see Roxie Funez who is a 53 year old female for follow-up of LEFT flank pain.      HPI   Roxie Funez is a very pleasant 53 year old female     Initially seen by Lainey Lunsford 1/3/2024:  Roxie Funez is a 53 year old female seen today in ER follow up for evaluation of the above. This was first detected on CT in the ED on 12/20/2023 after the patient presented complaining of acute renal colic symptoms. The CT noted Obstructing 0.5 cm stone near the left ureterovesical junction  causing mild left hydronephrosis. Nonobstructing 0.3 cm stone in the interpolar region of the left  Kidney noted as well. UA in the ED was negative for infection. BMP revealed Cr and Ca to be normal. The patient was discharged with a prescription for tamsulosin, oxycodone, and instructions to follow up with urology.    1/10/2024:  Ureteroscopy    1/19/2024:  Ureteral stent removal    TODAY 4/11/2024:  Started having symptoms of flank pain 1.5 weeks  Pain mostly on the left  Dull and ache most of the time  Some sharp pain at times  No fevers or chills  Using tylenol and advil     PHYSICAL EXAM  Patient is a 53 year old  female   Vitals: There were no vitals taken for this visit.  There is no height or weight on file to calculate BMI.  General Appearance Adult:   Alert, no acute distress, oriented  Neuro: Alert, oriented, speech and mentation normal  Psych: affect and mood normal     Creatinine   Date Value Ref Range Status   12/20/2023 0.69 0.51 - 0.95 mg/dL Final   05/15/2020 0.65 0.52 - 1.04 mg/dL Final   08/07/2018 0.72 0.52 - 1.04 mg/dL Final   10/31/2017 0.70 0.52 - 1.04 mg/dL Final   03/27/2012 0.56 0.52 - 1.04 mg/dL Final     Stone Composition See Note    Comment: Calculi composed primarily of:  20% calcium oxalate monohydrate, and  80% calcium oxalate dihydrate.     IMAGING:  All pertinent imaging reviewed:    All imaging studies reviewed by me.  I personally  reviewed these imaging films.  A formal report from radiology will follow.    CT ABD/PEL 12/20/2023:  FINDINGS:   LOWER CHEST: The visualized lung bases are clear.     HEPATOBILIARY: Mild hepatic steatosis. No hepatic masses are seen.     PANCREAS: Normal.     SPLEEN: Normal.     ADRENAL GLANDS: Normal.     KIDNEYS/BLADDER: There is an obstructing 0.5 cm stone in the distal  left ureter, causing mild left hydronephrosis. Nonobstructing 0.3 cm  stone in the polar region of the left kidney. No urinary calculi or  hydronephrosis on the right.     BOWEL: No bowel obstruction. No convincing evidence for colitis or  diverticulitis. Unremarkable appendix.     PELVIC ORGANS: Hysterectomy. No free fluid in the pelvis.     LYMPH NODES: No enlarged lymph nodes are identified in the abdomen or  pelvis.     VASCULATURE: Unremarkable.     ADDITIONAL FINDINGS: None.     MUSCULOSKELETAL: Unremarkable.                                                                      IMPRESSION:   1.  Obstructing 0.5 cm stone near the left ureterovesical junction  causing mild left hydronephrosis.  2.  Nonobstructing 0.3 cm stone in the interpolar region of the left  kidney.  3.  Mild hepatic steatosis.       ASSESSMENT and PLAN  53-year-old with history of left-sided nephrolithiasis status post ureteroscopy in January now with return of symptoms  Next week    Left-sided flank pain  - I again reviewed my operative note as well as her prior labs and CT imaging.  I reviewed the CT scan personally and agree with radiologist interpretation.  There was a 2 to 3 mm nonobstructive lower pole stone in addition to her larger UVJ stone.  - Given her return of symptoms we discussed the need for further evaluation with a BMP, CBC, and urinalysis as well as updated cross-sectional imaging with a CT noncontrast  - Orders for the above placed  - I will send her the results on [facility] and make the necessary follow-up arrangements  - We discussed signs and  symptoms of an infection with a UTI and that this would be a cause for more emergent presentation to the emergency room      Time spent: 15 minutes spent on the date of the encounter doing chart review, history and exam, documentation and further activities as noted above.    Brett Flores MD   Urology  [facility]  [facility] Phone: [phone]  [facility] Phone: [phone]          Virtual Visit Details    Type of service:  Video Visit     Originating Location (pt. Location): Home    Distant Location (provider location):  On-site  Platform used for Video Visit: [employer]

## 2024-04-11 NOTE — NURSING NOTE
Is the patient currently in the state of [place]? YES    Visit mode:VIDEO    If the visit is dropped, the patient can be reconnected by: VIDEO VISIT: Text to cell phone:   Telephone Information:   Mobile [phone]       Will anyone else be joining the visit? NO  (If patient encounters technical issues they should call [phone]    How would you like to obtain your AVS? MyChart    Are changes needed to the allergy or medication list? No      Reason for visit: RECHECK (RETURN KIDNEY STONE - left side pain)    Neisha ROJO

## 2024-04-12 ENCOUNTER — LAB (OUTPATIENT)
Dept: LAB | Facility: CLINIC | Age: 54
End: 2024-04-12
Payer: COMMERCIAL

## 2024-04-12 ENCOUNTER — TELEPHONE (OUTPATIENT)
Dept: UROLOGY | Facility: CLINIC | Age: 54
End: 2024-04-12

## 2024-04-12 DIAGNOSIS — R10.9 LEFT FLANK PAIN: ICD-10-CM

## 2024-04-12 LAB
ALBUMIN UR-MCNC: NEGATIVE MG/DL
ANION GAP SERPL CALCULATED.3IONS-SCNC: 10 MMOL/L (ref 7–15)
APPEARANCE UR: CLEAR
BILIRUB UR QL STRIP: NEGATIVE
BUN SERPL-MCNC: 10 MG/DL (ref 6–20)
CALCIUM SERPL-MCNC: 9.6 MG/DL (ref 8.6–10)
CHLORIDE SERPL-SCNC: 102 MMOL/L (ref 98–107)
COLOR UR AUTO: YELLOW
CREAT SERPL-MCNC: 0.71 MG/DL (ref 0.51–0.95)
DEPRECATED HCO3 PLAS-SCNC: 27 MMOL/L (ref 22–29)
EGFRCR SERPLBLD CKD-EPI 2021: >90 ML/MIN/1.73M2
ERYTHROCYTE [DISTWIDTH] IN BLOOD BY AUTOMATED COUNT: 12.2 % (ref 10–15)
GLUCOSE SERPL-MCNC: 136 MG/DL (ref 70–99)
GLUCOSE UR STRIP-MCNC: NEGATIVE MG/DL
HCT VFR BLD AUTO: 44.3 % (ref 35–47)
HGB BLD-MCNC: 14.9 G/DL (ref 11.7–15.7)
HGB UR QL STRIP: NEGATIVE
KETONES UR STRIP-MCNC: NEGATIVE MG/DL
LEUKOCYTE ESTERASE UR QL STRIP: NEGATIVE
MCH RBC QN AUTO: 31.6 PG (ref 26.5–33)
MCHC RBC AUTO-ENTMCNC: 33.6 G/DL (ref 31.5–36.5)
MCV RBC AUTO: 94 FL (ref 78–100)
NITRATE UR QL: NEGATIVE
PH UR STRIP: 6 [PH] (ref 5–7)
PLATELET # BLD AUTO: 298 10E3/UL (ref 150–450)
POTASSIUM SERPL-SCNC: 4 MMOL/L (ref 3.4–5.3)
RBC # BLD AUTO: 4.72 10E6/UL (ref 3.8–5.2)
SODIUM SERPL-SCNC: 139 MMOL/L (ref 135–145)
SP GR UR STRIP: <=1.005 (ref 1–1.03)
UROBILINOGEN UR STRIP-ACNC: 0.2 E.U./DL
WBC # BLD AUTO: 6.6 10E3/UL (ref 4–11)

## 2024-04-12 PROCEDURE — 85027 COMPLETE CBC AUTOMATED: CPT

## 2024-04-12 PROCEDURE — 36415 COLL VENOUS BLD VENIPUNCTURE: CPT

## 2024-04-12 PROCEDURE — 81003 URINALYSIS AUTO W/O SCOPE: CPT

## 2024-04-12 PROCEDURE — 80048 BASIC METABOLIC PNL TOTAL CA: CPT

## 2024-04-12 NOTE — TELEPHONE ENCOUNTER
Left Voicemail (1st Attempt) for the patient to call back and schedule the following:    Appointment type: Imaging  Provider: Dr. Flores  Return date: next available  Specialty phone number: [phone]  Additonal Notes: CT  scan next available. Dr Flores will mychart resutls once completed.     [facility]   Dermatology, Surgery, Urology  [facility]

## 2024-04-16 ENCOUNTER — NURSE TRIAGE (OUTPATIENT)
Dept: PEDIATRICS | Facility: CLINIC | Age: 54
End: 2024-04-16

## 2024-04-16 ENCOUNTER — VIRTUAL VISIT (OUTPATIENT)
Dept: URGENT CARE | Facility: CLINIC | Age: 54
End: 2024-04-16
Payer: COMMERCIAL

## 2024-04-16 DIAGNOSIS — U07.1 COVID: Primary | ICD-10-CM

## 2024-04-16 PROCEDURE — 99213 OFFICE O/P EST LOW 20 MIN: CPT | Mod: 95 | Performed by: EMERGENCY MEDICINE

## 2024-04-16 NOTE — PROGRESS NOTES
"Video visit:   Start time: 12:01 PM   Stop time: 12:10 PM   Duration: 9 minutes   Patient location: At home   Provider location:  [employer] [place] virtual provider (remote).    Platform used for video visit: [employer]       the patient has been notified of following:     \"This video visit will be conducted via a call between you and your physician/provider. We have found that certain health care needs can be provided without the need for a physical exam.  This service lets us provide the care you need with a short phone conversation.  If a prescription is necessary we can send it directly to your pharmacy.  If lab work is needed we can place an order for that and you can then stop by our lab to have the test done at a later time.    Video visits are billed at different rates depending on your insurance coverage. During this emergency period, for some insurers they may be billed the same as an in-person visit.  Please reach out to your insurance provider with any questions.    If during the course of the call the physician/provider feels a telephone visit is not appropriate, you will not be charged for this service.\"    Patient has given verbal consent for video visit?  Yes    What phone number would you like to be contacted at?  [phone]    How would you like to obtain your AVS? MyChart    Subjective   CC: Roxie Funez  is a 53 year old female who presents via phone visit today for the following health issues:   Chief Complaint   Patient presents with    Infection          COVID-19 Symptom Review  How many days ago did these symptoms start? 2    Are any of the following symptoms significant for you?  New or worsening difficulty breathing? No  Worsening cough? Yes, I am coughing up mucus.  Fever or chills? Yes, the highest temperature was 101  Headache: YES  Sore throat: No  Chest pain: No  Diarrhea: No  Body aches? No    What treatments has patient tried? Acetaminophen   Does patient live in a nursing home, " group home, or shelter? No  Does patient have a way to get food/medications during quarantined? Yes, I have a friend or family member who can help me. and Yes       Physical exam: No acute distress.  Alert and oriented.  Nondyspneic appearing speaking full sentences on video visit.    Assessment: Patient is a 53-year-old female who is on day 2 of COVID symptoms.  Tmax of 101.  No shortness of breath.  Symptoms are otherwise typical.  Patient has comorbidities of age at 53 and elevated BMI of 33.  She consents to taking Paxlovid.  GFR is greater than 90.  No drug drug interaction concerns.      ALEXANDRU Carmona MD

## 2024-04-16 NOTE — TELEPHONE ENCOUNTER
S-(situation): Symptoms started yesterday, positive test last night.    B-(background): Worse symptoms is body aches, headache. Has fever, cough, nasal congestion.    A-(assessment): No SOB or wheezing.     R-(recommendations): Needs virtual visit for prescription request. Assisted to schedule.     Reason for Disposition   COVID-19 diagnosed by positive lab test (e.g., PCR, rapid self-test kit) and mild symptoms (e.g., cough, fever, others) and no complications or SOB    Additional Information   Negative: SEVERE difficulty breathing (e.g., struggling for each breath, speaks in single words)   Negative: Difficult to awaken or acting confused (e.g., disoriented, slurred speech)   Negative: Bluish (or gray) lips or face now   Negative: Shock suspected (e.g., cold/pale/clammy skin, too weak to stand, low BP, rapid pulse)   Negative: Sounds like a life-threatening emergency to the triager   Negative: Diagnosed or suspected COVID-19 and symptoms lasting 3 or more weeks   Negative: COVID-19 exposure and no symptoms   Negative: COVID-19 vaccine reaction suspected (e.g., fever, headache, muscle aches) occurring 1 to 3 days after getting vaccine   Negative: COVID-19 vaccine, questions about   Negative: Lives with someone known to have influenza (flu test positive) and flu-like symptoms (e.g., cough, runny nose, sore throat, SOB; with or without fever)   Negative: Possible COVID-19 symptoms and triager concerned about severity of symptoms or other causes   Negative: COVID-19 and breastfeeding, questions about   Negative: SEVERE or constant chest pain or pressure  (Exception: Mild central chest pain, present only when coughing.)   Negative: MODERATE difficulty breathing (e.g., speaks in phrases, SOB even at rest, pulse 100-120)   Negative: Headache and stiff neck (can't touch chin to chest)   Negative: Oxygen level (e.g., pulse oximetry) 90% or lower   Negative: Chest pain or pressure  (Exception: MILD central chest pain,  present only when coughing.)   Negative: Drinking very little and dehydration suspected (e.g., no urine > 12 hours, very dry mouth, very lightheaded)   Negative: Patient sounds very sick or weak to the triager   Negative: MILD difficulty breathing (e.g., minimal/no SOB at rest, SOB with walking, pulse <100)   Negative: Fever > 103 F (39.4 C)   Negative: Fever > 101 F (38.3 C) and over 60 years of age   Negative: Fever > 100.0 F (37.8 C) and bedridden (e.g., CVA, chronic illness, recovering from surgery)   Negative: HIGH RISK patient (e.g., weak immune system, age > 64 years, obesity with BMI of 30 or higher, pregnant, chronic lung disease or other chronic medical condition) and COVID symptoms (e.g., cough, fever)  (Exceptions: Already seen by doctor or NP/PA and no new or worsening symptoms.)   Negative: HIGH RISK patient and influenza is widespread in the community and ONE OR MORE respiratory symptoms: cough, sore throat, runny or stuffy nose   Negative: HIGH RISK patient and influenza exposure within the last 7 days and ONE OR MORE respiratory symptoms: cough, sore throat, runny or stuffy nose   Negative: Oxygen level (e.g., pulse oximetry) 91 to 94%   Negative: COVID-19 infection suspected by caller or triager and mild symptoms (cough, fever, or others) and negative COVID-19 rapid test   Negative: Fever present > 3 days (72 hours)   Negative: Fever returns after gone for over 24 hours and symptoms worse or not improved   Negative: Continuous (nonstop) coughing interferes with work or school and no improvement using cough treatment per Care Advice   Negative: Cough present > 3 weeks   Negative: COVID-19 diagnosed by positive lab test (e.g., PCR, rapid self-test kit) and NO symptoms (e.g., cough, fever, others)    Protocols used: Coronavirus (COVID-19) Diagnosed or [id number]-T-ON    RN COVID TREATMENT VISIT  04/16/24      The patient has been triaged and does not require a higher level of care.    Roxie ZAYAS  Dee Dee  53 year old  Current weight?  NA    Has the patient been seen by a primary care provider at an [facility] or [facility] within the past two years? No, therefore patient is not eligible for COVID treatment standing order. Patient informed a virtual visit with a provider will be required for treatment. Patient will be scheduled or transferred to a  at the end of this call.   Natalia Wilkerson RN

## 2024-04-24 ENCOUNTER — ANCILLARY PROCEDURE (OUTPATIENT)
Dept: CT IMAGING | Facility: CLINIC | Age: 54
End: 2024-04-24
Attending: UROLOGY
Payer: COMMERCIAL

## 2024-04-24 DIAGNOSIS — N20.0 NEPHROLITHIASIS: ICD-10-CM

## 2024-04-24 DIAGNOSIS — R10.9 LEFT FLANK PAIN: ICD-10-CM

## 2024-04-24 PROCEDURE — 74176 CT ABD & PELVIS W/O CONTRAST: CPT

## 2024-05-03 ENCOUNTER — ANCILLARY PROCEDURE (OUTPATIENT)
Dept: MAMMOGRAPHY | Facility: CLINIC | Age: 54
End: 2024-05-03
Payer: COMMERCIAL

## 2024-05-03 DIAGNOSIS — Z12.31 VISIT FOR SCREENING MAMMOGRAM: ICD-10-CM

## 2024-05-03 PROCEDURE — 77067 SCR MAMMO BI INCL CAD: CPT | Mod: TC | Performed by: RADIOLOGY

## 2024-06-01 ENCOUNTER — HEALTH MAINTENANCE LETTER (OUTPATIENT)
Age: 54
End: 2024-06-01

## 2024-09-24 ENCOUNTER — PATIENT OUTREACH (OUTPATIENT)
Dept: CARE COORDINATION | Facility: CLINIC | Age: 54
End: 2024-09-24
Payer: COMMERCIAL

## (undated) DEVICE — RAD RX ISOVUE 300 (50ML) 61% IOPAMIDOL CHARGE PER ML

## (undated) DEVICE — BASKET NITINOL TIPLESS HALO  1.5FRX120CM 554120

## (undated) DEVICE — CATH URETERAL OPEN END 6FR AXXCESS

## (undated) DEVICE — GUIDEWIRE AMPLATZ SUPER STIFF .035 X 145CM M0066401080

## (undated) DEVICE — PAD CHUX UNDERPAD 23X24" 7136

## (undated) DEVICE — PACK CYSTOSCOPY SBA15CYFSI

## (undated) DEVICE — KIT ENDO TURNOVER/PROCEDURE W/CLEAN A SCOPE LINERS 103888

## (undated) DEVICE — PUMP SYSTEM SINGLE ACTION M0067201000

## (undated) DEVICE — GUIDEWIRE SENSOR DUAL FLEX STR 0.035"X150CM M0066703080

## (undated) DEVICE — BAG DRAIN URO FOR SIEMENS 8MM ADAPTER NS CC164NS-A

## (undated) DEVICE — DECANTER BAG 2002S

## (undated) DEVICE — LASER FIBER HOLMIUM MOSES 200 D/F/L AC-10030100

## (undated) DEVICE — SOL NACL 0.9% IRRIG 3000ML BAG 2B7477

## (undated) DEVICE — CATH TRAY FOLEY SURESTEP 16FR W/URNE MTR STLK LATEX A303316A

## (undated) DEVICE — SOL WATER IRRIG 1000ML BOTTLE 2F7114

## (undated) RX ORDER — KETOROLAC TROMETHAMINE 30 MG/ML
INJECTION, SOLUTION INTRAMUSCULAR; INTRAVENOUS
Status: DISPENSED
Start: 2024-01-10

## (undated) RX ORDER — FENTANYL CITRATE 50 UG/ML
INJECTION, SOLUTION INTRAMUSCULAR; INTRAVENOUS
Status: DISPENSED
Start: 2024-01-10

## (undated) RX ORDER — EPHEDRINE SULFATE 50 MG/ML
INJECTION, SOLUTION INTRAMUSCULAR; INTRAVENOUS; SUBCUTANEOUS
Status: DISPENSED
Start: 2024-01-10

## (undated) RX ORDER — ONDANSETRON 4 MG/1
TABLET, ORALLY DISINTEGRATING ORAL
Status: DISPENSED
Start: 2021-06-22

## (undated) RX ORDER — FENTANYL CITRATE 50 UG/ML
INJECTION, SOLUTION INTRAMUSCULAR; INTRAVENOUS
Status: DISPENSED
Start: 2021-06-22

## (undated) RX ORDER — FUROSEMIDE 10 MG/ML
INJECTION INTRAMUSCULAR; INTRAVENOUS
Status: DISPENSED
Start: 2024-01-10